# Patient Record
Sex: FEMALE | Race: WHITE | NOT HISPANIC OR LATINO | Employment: FULL TIME | URBAN - METROPOLITAN AREA
[De-identification: names, ages, dates, MRNs, and addresses within clinical notes are randomized per-mention and may not be internally consistent; named-entity substitution may affect disease eponyms.]

---

## 2023-04-12 ENCOUNTER — TELEPHONE (OUTPATIENT)
Dept: PRIMARY CARE | Facility: CLINIC | Age: 63
End: 2023-04-12
Payer: COMMERCIAL

## 2023-04-12 DIAGNOSIS — K21.9 GASTROESOPHAGEAL REFLUX DISEASE WITHOUT ESOPHAGITIS: Primary | ICD-10-CM

## 2023-04-12 PROBLEM — R91.1 LUNG NODULE: Status: ACTIVE | Noted: 2023-04-12

## 2023-04-12 PROBLEM — K59.00 CONSTIPATION: Status: ACTIVE | Noted: 2023-04-12

## 2023-04-12 PROBLEM — E78.5 HYPERLIPIDEMIA: Status: ACTIVE | Noted: 2023-04-12

## 2023-04-12 PROBLEM — K57.90 DIVERTICULOSIS OF INTESTINE: Status: ACTIVE | Noted: 2023-04-12

## 2023-04-12 PROBLEM — M79.672 LEFT FOOT PAIN: Status: ACTIVE | Noted: 2023-04-12

## 2023-04-12 PROBLEM — S92.353A CLOSED FRACTURE OF FIFTH METATARSAL BONE: Status: ACTIVE | Noted: 2023-04-12

## 2023-04-12 PROBLEM — H93.13 BILATERAL TINNITUS: Status: ACTIVE | Noted: 2023-04-12

## 2023-04-12 PROBLEM — N95.0 POSTMENOPAUSAL BLEEDING: Status: ACTIVE | Noted: 2023-04-12

## 2023-04-12 PROBLEM — N20.0 CALCIUM KIDNEY STONE: Status: ACTIVE | Noted: 2023-04-12

## 2023-04-12 PROBLEM — I10 HYPERTENSION: Status: ACTIVE | Noted: 2023-04-12

## 2023-04-12 PROBLEM — K63.5 COLON POLYPS: Status: ACTIVE | Noted: 2023-04-12

## 2023-04-12 PROBLEM — J30.2 SEASONAL ALLERGIES: Status: ACTIVE | Noted: 2023-04-12

## 2023-04-12 PROBLEM — R00.2 PALPITATIONS: Status: ACTIVE | Noted: 2023-04-12

## 2023-04-12 PROBLEM — M54.16 LUMBAR RADICULOPATHY, RIGHT: Status: ACTIVE | Noted: 2023-04-12

## 2023-04-12 PROBLEM — I25.3 ATRIAL SEPTAL ANEURYSM: Status: ACTIVE | Noted: 2023-04-12

## 2023-04-12 RX ORDER — ASPIRIN 81 MG/1
1 TABLET ORAL DAILY
COMMUNITY
Start: 2022-06-02

## 2023-04-12 RX ORDER — CETIRIZINE HYDROCHLORIDE 10 MG/1
1 TABLET ORAL DAILY
COMMUNITY

## 2023-04-12 RX ORDER — PANTOPRAZOLE SODIUM 40 MG/1
40 TABLET, DELAYED RELEASE ORAL DAILY
COMMUNITY
End: 2024-02-07 | Stop reason: SDUPTHER

## 2023-04-12 RX ORDER — LORATADINE 10 MG/1
1 TABLET ORAL DAILY PRN
COMMUNITY
Start: 2022-04-26

## 2023-04-12 RX ORDER — IPRATROPIUM BROMIDE 21 UG/1
SPRAY, METERED NASAL
COMMUNITY
Start: 2017-09-12 | End: 2023-07-10 | Stop reason: ALTCHOICE

## 2023-04-12 RX ORDER — FAMOTIDINE 40 MG/1
TABLET, FILM COATED ORAL
COMMUNITY
End: 2023-04-12 | Stop reason: SDUPTHER

## 2023-04-12 RX ORDER — FAMOTIDINE 40 MG/1
TABLET, FILM COATED ORAL
Qty: 90 TABLET | Refills: 3 | Status: SHIPPED | OUTPATIENT
Start: 2023-04-12

## 2023-04-12 RX ORDER — ALBUTEROL SULFATE 90 UG/1
AEROSOL, METERED RESPIRATORY (INHALATION)
COMMUNITY

## 2023-04-12 RX ORDER — IRBESARTAN 75 MG/1
75 TABLET ORAL DAILY
COMMUNITY
End: 2023-12-28

## 2023-04-12 RX ORDER — IPRATROPIUM BROMIDE 42 UG/1
SPRAY, METERED NASAL
COMMUNITY

## 2023-04-12 RX ORDER — ATORVASTATIN CALCIUM 10 MG/1
10 TABLET, FILM COATED ORAL DAILY
COMMUNITY
End: 2024-02-07 | Stop reason: SDUPTHER

## 2023-04-12 RX ORDER — BUDESONIDE AND FORMOTEROL FUMARATE DIHYDRATE 160; 4.5 UG/1; UG/1
AEROSOL RESPIRATORY (INHALATION)
COMMUNITY

## 2023-04-12 NOTE — TELEPHONE ENCOUNTER
Rx Refill Request Telephone Encounter    Name:  Amarilis Burger  :  [id number]  Medication Name:  Famotidine  40mg          Specific Pharmacy location:  [facility] [place]

## 2023-07-03 PROBLEM — G56.03 BILATERAL CARPAL TUNNEL SYNDROME: Status: ACTIVE | Noted: 2023-07-03

## 2023-07-03 PROBLEM — R10.13 DYSPEPSIA: Status: ACTIVE | Noted: 2023-07-03

## 2023-07-03 PROBLEM — R06.09 OTHER FORMS OF DYSPNEA: Status: ACTIVE | Noted: 2023-07-03

## 2023-07-03 PROBLEM — M15.9 GENERALIZED OSTEOARTHRITIS OF MULTIPLE SITES: Status: ACTIVE | Noted: 2023-07-03

## 2023-07-03 PROBLEM — S92.355D NONDISPLACED FRACTURE OF FIFTH LEFT METATARSAL BONE WITH ROUTINE HEALING: Status: ACTIVE | Noted: 2023-07-03

## 2023-07-03 RX ORDER — ERGOCALCIFEROL 1.25 MG/1
1.25 CAPSULE ORAL
COMMUNITY
Start: 2017-09-12

## 2023-07-03 RX ORDER — SIMETHICONE 125 MG
125 CAPSULE ORAL 4 TIMES DAILY
COMMUNITY
Start: 2023-05-25

## 2023-07-03 RX ORDER — LACTOBACILLUS ACIDOPHILUS/FOS 500MM-50MG
50 TABLET ORAL DAILY
COMMUNITY
Start: 2017-09-12

## 2023-07-10 ENCOUNTER — OFFICE VISIT (OUTPATIENT)
Dept: PRIMARY CARE | Facility: CLINIC | Age: 63
End: 2023-07-10
Payer: COMMERCIAL

## 2023-07-10 VITALS
HEART RATE: 68 BPM | WEIGHT: 179 LBS | HEIGHT: 64 IN | BODY MASS INDEX: 30.56 KG/M2 | DIASTOLIC BLOOD PRESSURE: 78 MMHG | SYSTOLIC BLOOD PRESSURE: 118 MMHG

## 2023-07-10 DIAGNOSIS — K21.9 GASTROESOPHAGEAL REFLUX DISEASE WITHOUT ESOPHAGITIS: ICD-10-CM

## 2023-07-10 DIAGNOSIS — I25.3 ATRIAL SEPTAL ANEURYSM: ICD-10-CM

## 2023-07-10 DIAGNOSIS — M25.50 POLYARTHRALGIA: Primary | ICD-10-CM

## 2023-07-10 DIAGNOSIS — M15.9 GENERALIZED OSTEOARTHRITIS OF MULTIPLE SITES: ICD-10-CM

## 2023-07-10 DIAGNOSIS — J45.40 MODERATE PERSISTENT ASTHMA WITHOUT COMPLICATION (HHS-HCC): ICD-10-CM

## 2023-07-10 DIAGNOSIS — U09.9 POST-COVID SYNDROME: ICD-10-CM

## 2023-07-10 DIAGNOSIS — R10.13 DYSPEPSIA: ICD-10-CM

## 2023-07-10 DIAGNOSIS — E78.2 MIXED HYPERLIPIDEMIA: ICD-10-CM

## 2023-07-10 PROBLEM — R00.2 PALPITATIONS: Status: RESOLVED | Noted: 2023-04-12 | Resolved: 2023-07-10

## 2023-07-10 PROCEDURE — 1126F AMNT PAIN NOTED NONE PRSNT: CPT | Performed by: INTERNAL MEDICINE

## 2023-07-10 PROCEDURE — 3074F SYST BP LT 130 MM HG: CPT | Performed by: INTERNAL MEDICINE

## 2023-07-10 PROCEDURE — 1160F RVW MEDS BY RX/DR IN RCRD: CPT | Performed by: INTERNAL MEDICINE

## 2023-07-10 PROCEDURE — 99213 OFFICE O/P EST LOW 20 MIN: CPT | Performed by: INTERNAL MEDICINE

## 2023-07-10 PROCEDURE — 1036F TOBACCO NON-USER: CPT | Performed by: INTERNAL MEDICINE

## 2023-07-10 PROCEDURE — 3078F DIAST BP <80 MM HG: CPT | Performed by: INTERNAL MEDICINE

## 2023-07-10 PROCEDURE — 1159F MED LIST DOCD IN RCRD: CPT | Performed by: INTERNAL MEDICINE

## 2023-07-10 ASSESSMENT — ENCOUNTER SYMPTOMS
MYALGIAS: 1
ABDOMINAL PAIN: 1
OCCASIONAL FEELINGS OF UNSTEADINESS: 0
LOSS OF SENSATION IN FEET: 0
DEPRESSION: 0
FATIGUE: 1
CONSTIPATION: 1
NAUSEA: 1
RHINORRHEA: 1
ARTHRALGIAS: 1

## 2023-07-10 ASSESSMENT — PATIENT HEALTH QUESTIONNAIRE - PHQ9
1. LITTLE INTEREST OR PLEASURE IN DOING THINGS: NOT AT ALL
2. FEELING DOWN, DEPRESSED OR HOPELESS: NOT AT ALL
SUM OF ALL RESPONSES TO PHQ9 QUESTIONS 1 AND 2: 0

## 2023-07-10 ASSESSMENT — ANXIETY QUESTIONNAIRES
3. WORRYING TOO MUCH ABOUT DIFFERENT THINGS: NOT AT ALL
IF YOU CHECKED OFF ANY PROBLEMS ON THIS QUESTIONNAIRE, HOW DIFFICULT HAVE THESE PROBLEMS MADE IT FOR YOU TO DO YOUR WORK, TAKE CARE OF THINGS AT HOME, OR GET ALONG WITH OTHER PEOPLE: NOT DIFFICULT AT ALL
7. FEELING AFRAID AS IF SOMETHING AWFUL MIGHT HAPPEN: NOT AT ALL
2. NOT BEING ABLE TO STOP OR CONTROL WORRYING: NOT AT ALL
4. TROUBLE RELAXING: NOT AT ALL
GAD7 TOTAL SCORE: 0
6. BECOMING EASILY ANNOYED OR IRRITABLE: NOT AT ALL
1. FEELING NERVOUS, ANXIOUS, OR ON EDGE: NOT AT ALL
5. BEING SO RESTLESS THAT IT IS HARD TO SIT STILL: NOT AT ALL

## 2023-07-10 NOTE — ASSESSMENT & PLAN NOTE
Continues on famotidine 40 mg daily with pantoprazole 40 mg daily patient exhibiting dyspepsia symptoms work-up by gastroenterology

## 2023-07-10 NOTE — ASSESSMENT & PLAN NOTE
Continues on Symbicort along with treatment of allergies also component of post-COVID syndrome and chronic fatigue stable at this time

## 2023-07-10 NOTE — ASSESSMENT & PLAN NOTE
Patient to be evaluated by orthopedic surgeon at [facility] Dr. 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

## 2023-07-10 NOTE — PROGRESS NOTES
"Subjective   Patient ID: Amarilis Burger is a 63 y.o. female who presents for Follow-up.    HPI     Review of Systems    Objective   /78 (BP Location: Left arm, Patient Position: Sitting)   Pulse 68   Ht 1.626 m (5' 4\")   Wt 81.2 kg (179 lb)   BMI 30.73 kg/m²     Physical Exam    Assessment/Plan          "

## 2023-07-10 NOTE — ASSESSMENT & PLAN NOTE
Follows with cardiologist stable at this time repeat lipid profile continue atorvastatin 10 mg daily

## 2023-07-10 NOTE — ASSESSMENT & PLAN NOTE
Patient with gallbladder type symptoms ultrasound of gallbladder revealed no gallstones 4 mm gallbladder polyp no acute inflammation HIDA scan looking at gastric emptying and gallbladder function was normal as follow-up with gastroenterology to discuss work-up

## 2023-07-10 NOTE — PROGRESS NOTES
"Subjective   Reason for Visit: Amarilis Burger is an 63 y.o. female here for a Medicare Wellness visit.     Past Medical, Surgical, and Family History reviewed and updated in chart.    Reviewed all medications by prescribing practitioner or clinical pharmacist (such as prescriptions, OTCs, herbal therapies and supplements) and documented in the medical record.    Patient states generally does not feel well as significant chronic fatigue combined with diffuse polyarthralgias and significant arthritis of her shoulders exacerbated by cervical radiculopathy in the absence of neck pain carpal tunnel syndrome chronic fatigue exacerbated by stress levels aching of muscles as well blood pressures have been stable no issues with cardiac at this time sleep has been erratic related to stress levels        Patient Care Team:  Reinaldo Nettles DO as PCP - General     Review of Systems   Constitutional:  Positive for fatigue.   HENT:  Positive for postnasal drip and rhinorrhea.    Gastrointestinal:  Positive for abdominal pain, constipation and nausea.   Musculoskeletal:  Positive for arthralgias and myalgias.       Objective   Vitals:  /78 (BP Location: Left arm, Patient Position: Sitting)   Pulse 68   Ht 1.626 m (5' 4\")   Wt 81.2 kg (179 lb)   BMI 30.73 kg/m²       Physical Exam  Vitals and nursing note reviewed.   Constitutional:       General: She is not in acute distress.     Appearance: Normal appearance. She is well-developed. She is not toxic-appearing.   HENT:      Head: Normocephalic and atraumatic.      Right Ear: Tympanic membrane and external ear normal.      Left Ear: Tympanic membrane and external ear normal.      Nose: Nose normal.      Mouth/Throat:      Mouth: Mucous membranes are moist.      Pharynx: Oropharynx is clear. No oropharyngeal exudate or posterior oropharyngeal erythema.      Tonsils: No tonsillar exudate. 2+ on the right. 2+ on the left.   Eyes:      Extraocular Movements: Extraocular " movements intact.      Conjunctiva/sclera: Conjunctivae normal.   Cardiovascular:      Rate and Rhythm: Normal rate and regular rhythm.      Pulses: Normal pulses.      Heart sounds: Normal heart sounds. No murmur heard.  Pulmonary:      Effort: Pulmonary effort is normal.      Breath sounds: Normal breath sounds.   Abdominal:      General: Abdomen is flat. Bowel sounds are normal.      Palpations: Abdomen is soft.   Musculoskeletal:      Cervical back: Neck supple.   Lymphadenopathy:      Cervical: No cervical adenopathy.   Skin:     General: Skin is warm and dry.      Findings: No rash.   Neurological:      Mental Status: She is alert. Mental status is at baseline.   Psychiatric:         Mood and Affect: Mood normal.         Behavior: Behavior normal.         Thought Content: Thought content normal.         Judgment: Judgment normal.         Assessment/Plan   Problem List Items Addressed This Visit       Atrial septal aneurysm    Current Assessment & Plan     Stable on irbesartan reevaluate with lab work         GERD (gastroesophageal reflux disease)    Current Assessment & Plan     Continues on famotidine 40 mg daily with pantoprazole 40 mg daily patient exhibiting dyspepsia symptoms work-up by gastroenterology         Hyperlipidemia    Current Assessment & Plan     Follows with cardiologist stable at this time repeat lipid profile continue atorvastatin 10 mg daily         Dyspepsia    Current Assessment & Plan     Patient with gallbladder type symptoms ultrasound of gallbladder revealed no gallstones 4 mm gallbladder polyp no acute inflammation HIDA scan looking at gastric emptying and gallbladder function was normal as follow-up with gastroenterology to discuss work-up         Generalized osteoarthritis of multiple sites    Current Assessment & Plan     Patient to be evaluated by orthopedic surgeon at [facility] Dr. 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 tunnel syndrome         Moderate persistent asthma without complication    Current Assessment & Plan     Continues on Symbicort along with treatment of allergies also component of post-COVID syndrome and chronic fatigue stable at this time         Post-COVID syndrome    Overview     Continue work addressing risk factors for chronic fatigue and possible diagnosis work-up of fibromyalgia we will check autoimmune panel including MAVERICK sed rate as it relates to her chronic polyarthralgias and myalgias with significant chronic fatigue component also stress related to work environment could benefit from behavioral counseling versus treatment of mild depression we will reevaluate in 3 months          Other Visit Diagnoses       Polyarthralgia    -  Primary    Relevant Orders    CBC and Auto Differential    Comprehensive metabolic panel    Lipid Panel    Hepatitis C antibody    Sedimentation Rate    MAVERICK with Reflex to BANDAR    Rheumatoid factor    Follow Up In [facility] - PCP - Established

## 2023-07-12 ENCOUNTER — LAB (OUTPATIENT)
Dept: LAB | Facility: LAB | Age: 63
End: 2023-07-12
Payer: COMMERCIAL

## 2023-07-12 DIAGNOSIS — M25.50 POLYARTHRALGIA: ICD-10-CM

## 2023-07-12 LAB
ALANINE AMINOTRANSFERASE (SGPT) (U/L) IN SER/PLAS: 14 U/L (ref 7–45)
ALBUMIN (G/DL) IN SER/PLAS: 4.5 G/DL (ref 3.4–5)
ALKALINE PHOSPHATASE (U/L) IN SER/PLAS: 43 U/L (ref 33–136)
ANION GAP IN SER/PLAS: 11 MMOL/L (ref 10–20)
ASPARTATE AMINOTRANSFERASE (SGOT) (U/L) IN SER/PLAS: 13 U/L (ref 9–39)
BASOPHILS (10*3/UL) IN BLOOD BY AUTOMATED COUNT: 0.02 X10E9/L (ref 0–0.1)
BASOPHILS/100 LEUKOCYTES IN BLOOD BY AUTOMATED COUNT: 0.3 % (ref 0–2)
BILIRUBIN TOTAL (MG/DL) IN SER/PLAS: 0.5 MG/DL (ref 0–1.2)
CALCIUM (MG/DL) IN SER/PLAS: 9.3 MG/DL (ref 8.6–10.3)
CARBON DIOXIDE, TOTAL (MMOL/L) IN SER/PLAS: 26 MMOL/L (ref 21–32)
CHLORIDE (MMOL/L) IN SER/PLAS: 108 MMOL/L (ref 98–107)
CHOLESTEROL (MG/DL) IN SER/PLAS: 158 MG/DL (ref 0–199)
CHOLESTEROL IN HDL (MG/DL) IN SER/PLAS: 64.5 MG/DL
CHOLESTEROL/HDL RATIO: 2.4
CREATININE (MG/DL) IN SER/PLAS: 0.62 MG/DL (ref 0.5–1.05)
ERYTHROCYTE DISTRIBUTION WIDTH (RATIO) BY AUTOMATED COUNT: 12.6 % (ref 11.5–14.5)
ERYTHROCYTE MEAN CORPUSCULAR HEMOGLOBIN CONCENTRATION (G/DL) BY AUTOMATED: 32.9 G/DL (ref 32–36)
ERYTHROCYTE MEAN CORPUSCULAR VOLUME (FL) BY AUTOMATED COUNT: 94 FL (ref 80–100)
ERYTHROCYTES (10*6/UL) IN BLOOD BY AUTOMATED COUNT: 4 X10E12/L (ref 4–5.2)
GFR FEMALE: >90 ML/MIN/1.73M2
GLUCOSE (MG/DL) IN SER/PLAS: 97 MG/DL (ref 74–99)
HEMATOCRIT (%) IN BLOOD BY AUTOMATED COUNT: 37.7 % (ref 36–46)
HEMOGLOBIN (G/DL) IN BLOOD: 12.4 G/DL (ref 12–16)
HEPATITIS C VIRUS AB PRESENCE IN SERUM: NONREACTIVE
IMMATURE GRANULOCYTES/100 LEUKOCYTES IN BLOOD BY AUTOMATED COUNT: 0.4 % (ref 0–0.9)
LDL: 80 MG/DL (ref 0–99)
LEUKOCYTES (10*3/UL) IN BLOOD BY AUTOMATED COUNT: 7.1 X10E9/L (ref 4.4–11.3)
LYMPHOCYTES (10*3/UL) IN BLOOD BY AUTOMATED COUNT: 1.34 X10E9/L (ref 1.2–4.8)
LYMPHOCYTES/100 LEUKOCYTES IN BLOOD BY AUTOMATED COUNT: 18.9 % (ref 13–44)
MONOCYTES (10*3/UL) IN BLOOD BY AUTOMATED COUNT: 0.67 X10E9/L (ref 0.1–1)
MONOCYTES/100 LEUKOCYTES IN BLOOD BY AUTOMATED COUNT: 9.4 % (ref 2–10)
NEUTROPHILS (10*3/UL) IN BLOOD BY AUTOMATED COUNT: 5.03 X10E9/L (ref 1.2–7.7)
NEUTROPHILS/100 LEUKOCYTES IN BLOOD BY AUTOMATED COUNT: 71 % (ref 40–80)
PLATELETS (10*3/UL) IN BLOOD AUTOMATED COUNT: 186 X10E9/L (ref 150–450)
POTASSIUM (MMOL/L) IN SER/PLAS: 4.3 MMOL/L (ref 3.5–5.3)
PROTEIN TOTAL: 6.4 G/DL (ref 6.4–8.2)
RHEUMATOID FACTOR (IU/ML) IN SERUM OR PLASMA: 15 IU/ML (ref 0–15)
SEDIMENTATION RATE, ERYTHROCYTE: 5 MM/H (ref 0–30)
SODIUM (MMOL/L) IN SER/PLAS: 141 MMOL/L (ref 136–145)
TRIGLYCERIDE (MG/DL) IN SER/PLAS: 69 MG/DL (ref 0–149)
UREA NITROGEN (MG/DL) IN SER/PLAS: 14 MG/DL (ref 6–23)
VLDL: 14 MG/DL (ref 0–40)

## 2023-07-12 PROCEDURE — 36415 COLL VENOUS BLD VENIPUNCTURE: CPT

## 2023-07-12 PROCEDURE — 80061 LIPID PANEL: CPT

## 2023-07-12 PROCEDURE — 86803 HEPATITIS C AB TEST: CPT

## 2023-07-12 PROCEDURE — 80053 COMPREHEN METABOLIC PANEL: CPT

## 2023-07-12 PROCEDURE — 85025 COMPLETE CBC W/AUTO DIFF WBC: CPT

## 2023-07-12 PROCEDURE — 85652 RBC SED RATE AUTOMATED: CPT

## 2023-07-12 PROCEDURE — 86431 RHEUMATOID FACTOR QUANT: CPT

## 2023-07-12 PROCEDURE — 86039 ANTINUCLEAR ANTIBODIES (ANA): CPT

## 2023-07-12 PROCEDURE — 86225 DNA ANTIBODY NATIVE: CPT

## 2023-07-12 PROCEDURE — 86038 ANTINUCLEAR ANTIBODIES: CPT

## 2023-07-12 PROCEDURE — 86235 NUCLEAR ANTIGEN ANTIBODY: CPT

## 2023-07-13 LAB
ANA PATTERN: ABNORMAL
ANA TITER: ABNORMAL
ANTI-CENTROMERE: <0.2 AI
ANTI-CHROMATIN: <0.2 AI
ANTI-DNA (DS): <1 IU/ML
ANTI-JO-1 IGG: <0.2 AI
ANTI-NUCLEAR ANTIBODY (ANA): POSITIVE
ANTI-RIBOSOMAL P: <0.2 AI
ANTI-RNP: <0.2 AI
ANTI-SCL-70: <0.2 AI
ANTI-SM/RNP: <0.2 AI
ANTI-SM: <0.2 AI
ANTI-SSA: <0.2 AI
ANTI-SSB: <0.2 AI

## 2023-08-22 ENCOUNTER — HOSPITAL ENCOUNTER (OUTPATIENT)
Dept: DATA CONVERSION | Facility: HOSPITAL | Age: 63
End: 2023-08-22
Attending: INTERNAL MEDICINE | Admitting: INTERNAL MEDICINE
Payer: COMMERCIAL

## 2023-08-22 DIAGNOSIS — R10.13 EPIGASTRIC PAIN: ICD-10-CM

## 2023-08-22 DIAGNOSIS — K29.50 UNSPECIFIED CHRONIC GASTRITIS WITHOUT BLEEDING: ICD-10-CM

## 2023-08-22 DIAGNOSIS — K44.9 DIAPHRAGMATIC HERNIA WITHOUT OBSTRUCTION OR GANGRENE: ICD-10-CM

## 2023-08-24 LAB
COMPLETE PATHOLOGY REPORT: NORMAL
CONVERTED CLINICAL DIAGNOSIS-HISTORY: NORMAL
CONVERTED FINAL DIAGNOSIS: NORMAL
CONVERTED FINAL REPORT PDF LINK TO COPY AND PASTE: NORMAL
CONVERTED GROSS DESCRIPTION: NORMAL

## 2023-09-29 VITALS — BODY MASS INDEX: 31.64 KG/M2 | HEIGHT: 63 IN | WEIGHT: 178.57 LBS

## 2023-10-03 ENCOUNTER — APPOINTMENT (OUTPATIENT)
Dept: GASTROENTEROLOGY | Facility: CLINIC | Age: 63
End: 2023-10-03
Payer: COMMERCIAL

## 2023-10-09 DIAGNOSIS — Z23 FLU VACCINE NEED: ICD-10-CM

## 2023-10-10 ENCOUNTER — OFFICE VISIT (OUTPATIENT)
Dept: PRIMARY CARE | Facility: CLINIC | Age: 63
End: 2023-10-10
Payer: COMMERCIAL

## 2023-10-10 VITALS
DIASTOLIC BLOOD PRESSURE: 70 MMHG | HEIGHT: 64 IN | SYSTOLIC BLOOD PRESSURE: 110 MMHG | BODY MASS INDEX: 31.07 KG/M2 | WEIGHT: 182 LBS | HEART RATE: 68 BPM

## 2023-10-10 DIAGNOSIS — I10 PRIMARY HYPERTENSION: Primary | ICD-10-CM

## 2023-10-10 DIAGNOSIS — M25.50 POLYARTHRALGIA: ICD-10-CM

## 2023-10-10 DIAGNOSIS — R42 VERTIGO: ICD-10-CM

## 2023-10-10 DIAGNOSIS — Z23 FLU VACCINE NEED: ICD-10-CM

## 2023-10-10 DIAGNOSIS — U09.9 POST-COVID SYNDROME: ICD-10-CM

## 2023-10-10 DIAGNOSIS — H81.13 BPV (BENIGN POSITIONAL VERTIGO), BILATERAL: ICD-10-CM

## 2023-10-10 DIAGNOSIS — E78.2 MIXED HYPERLIPIDEMIA: ICD-10-CM

## 2023-10-10 DIAGNOSIS — K21.9 GASTROESOPHAGEAL REFLUX DISEASE WITHOUT ESOPHAGITIS: ICD-10-CM

## 2023-10-10 DIAGNOSIS — J45.40 MODERATE PERSISTENT ASTHMA WITHOUT COMPLICATION (HHS-HCC): ICD-10-CM

## 2023-10-10 DIAGNOSIS — E66.09 CLASS 1 OBESITY DUE TO EXCESS CALORIES WITH SERIOUS COMORBIDITY AND BODY MASS INDEX (BMI) OF 31.0 TO 31.9 IN ADULT: ICD-10-CM

## 2023-10-10 PROBLEM — E66.811 CLASS 1 OBESITY DUE TO EXCESS CALORIES WITH SERIOUS COMORBIDITY AND BODY MASS INDEX (BMI) OF 31.0 TO 31.9 IN ADULT: Status: ACTIVE | Noted: 2023-10-10

## 2023-10-10 PROBLEM — Z00.00 PREVENTATIVE HEALTH CARE: Status: ACTIVE | Noted: 2023-10-10

## 2023-10-10 PROBLEM — R10.13 DYSPEPSIA: Status: RESOLVED | Noted: 2023-07-03 | Resolved: 2023-10-10

## 2023-10-10 PROBLEM — M79.672 LEFT FOOT PAIN: Status: RESOLVED | Noted: 2023-04-12 | Resolved: 2023-10-10

## 2023-10-10 PROBLEM — S92.353A CLOSED FRACTURE OF FIFTH METATARSAL BONE: Status: RESOLVED | Noted: 2023-04-12 | Resolved: 2023-10-10

## 2023-10-10 PROCEDURE — 90471 IMMUNIZATION ADMIN: CPT | Performed by: INTERNAL MEDICINE

## 2023-10-10 PROCEDURE — 3078F DIAST BP <80 MM HG: CPT | Performed by: INTERNAL MEDICINE

## 2023-10-10 PROCEDURE — 1036F TOBACCO NON-USER: CPT | Performed by: INTERNAL MEDICINE

## 2023-10-10 PROCEDURE — 1159F MED LIST DOCD IN RCRD: CPT | Performed by: INTERNAL MEDICINE

## 2023-10-10 PROCEDURE — 90662 IIV NO PRSV INCREASED AG IM: CPT | Performed by: INTERNAL MEDICINE

## 2023-10-10 PROCEDURE — 1126F AMNT PAIN NOTED NONE PRSNT: CPT | Performed by: INTERNAL MEDICINE

## 2023-10-10 PROCEDURE — 3008F BODY MASS INDEX DOCD: CPT | Performed by: INTERNAL MEDICINE

## 2023-10-10 PROCEDURE — 1160F RVW MEDS BY RX/DR IN RCRD: CPT | Performed by: INTERNAL MEDICINE

## 2023-10-10 PROCEDURE — 99213 OFFICE O/P EST LOW 20 MIN: CPT | Performed by: INTERNAL MEDICINE

## 2023-10-10 PROCEDURE — 3074F SYST BP LT 130 MM HG: CPT | Performed by: INTERNAL MEDICINE

## 2023-10-10 RX ORDER — MECLIZINE HYDROCHLORIDE 25 MG/1
25 TABLET ORAL 3 TIMES DAILY PRN
COMMUNITY
End: 2023-10-10 | Stop reason: SDUPTHER

## 2023-10-10 RX ORDER — MECLIZINE HYDROCHLORIDE 25 MG/1
25 TABLET ORAL 3 TIMES DAILY PRN
Qty: 180 TABLET | Refills: 1 | Status: SHIPPED | OUTPATIENT
Start: 2023-10-10

## 2023-10-10 NOTE — PROGRESS NOTES
"Subjective   Reason for Visit: Amarilis Burger is an 63 y.o. female here for a fu visit.     Past Medical, Surgical, and Family History reviewed and updated in chart.    Reviewed all medications by prescribing practitioner or clinical pharmacist (such as prescriptions, OTCs, herbal therapies and supplements) and documented in the medical record.    HPI    Patient Care Team:  Reinaldo Nettles DO as PCP - General     Review of Systems   All other systems reviewed and are negative.      Objective   Vitals:  /70 (BP Location: Left arm, Patient Position: Sitting)   Pulse 68   Ht 1.626 m (5' 4\")   Wt 82.6 kg (182 lb)   BMI 31.24 kg/m²       Physical Exam  Vitals and nursing note reviewed.   Constitutional:       General: She is not in acute distress.     Appearance: Normal appearance. She is well-developed. She is obese. She is not toxic-appearing.   HENT:      Head: Normocephalic and atraumatic.      Right Ear: Tympanic membrane and external ear normal.      Left Ear: Tympanic membrane and external ear normal.      Nose: Nose normal.      Mouth/Throat:      Mouth: Mucous membranes are moist.      Pharynx: Oropharynx is clear. No oropharyngeal exudate or posterior oropharyngeal erythema.      Tonsils: No tonsillar exudate. 2+ on the right. 2+ on the left.   Eyes:      Extraocular Movements: Extraocular movements intact.      Conjunctiva/sclera: Conjunctivae normal.   Cardiovascular:      Rate and Rhythm: Normal rate and regular rhythm.      Pulses: Normal pulses.      Heart sounds: Normal heart sounds. No murmur heard.  Pulmonary:      Effort: Pulmonary effort is normal.      Breath sounds: Normal breath sounds.   Abdominal:      General: Abdomen is flat. Bowel sounds are normal.      Palpations: Abdomen is soft.   Musculoskeletal:      Cervical back: Neck supple.   Lymphadenopathy:      Cervical: No cervical adenopathy.   Skin:     General: Skin is warm and dry.      Findings: No rash.   Neurological:      " Mental Status: She is alert. Mental status is at baseline.   Psychiatric:         Mood and Affect: Mood normal.         Behavior: Behavior normal.         Thought Content: Thought content normal.         Judgment: Judgment normal.         Assessment/Plan   Problem List Items Addressed This Visit       GERD (gastroesophageal reflux disease)    Current Assessment & Plan     Recent EGD revealed mild gastritis without ulcer patient continues on pantoprazole 40 mg daily with famotidine nightly continue         Relevant Orders    BI mammo bilateral screening tomosynthesis    XR DEXA bone density    Referral to Rheumatology    Follow Up In [facility] - PCP - Established    Comprehensive metabolic panel    Lipid Panel    CK    Hyperlipidemia    Current Assessment & Plan     Continue atorvastatin 10 mg daily reevaluate lipid profile with next visit         Hypertension - Primary    Current Assessment & Plan     Blood pressure well controlled on irbesartan 75 mg daily continue         Relevant Orders    BI mammo bilateral screening tomosynthesis    XR DEXA bone density    Referral to Rheumatology    Follow Up In [facility] - PCP - Established    Comprehensive metabolic panel    Lipid Panel    CK    Moderate persistent asthma without complication    Current Assessment & Plan     Well-controlled without exacerbation at this time patient had mild upper respiratory tract illness with eustachian tube dysfunction affecting right ear sees allergist immunologist next week up-to-date with vaccinations         Relevant Orders    BI mammo bilateral screening tomosynthesis    XR DEXA bone density    Referral to Rheumatology    Follow Up In [facility] - PCP - Established    Comprehensive metabolic panel    Lipid Panel    CK    Post-COVID syndrome    Overview     Continue work addressing risk factors for chronic fatigue and possible diagnosis work-up of fibromyalgia we will check autoimmune panel including  MAVERICK sed rate as it relates to her chronic polyarthralgias and myalgias with significant chronic fatigue component also stress related to work environment could benefit from behavioral counseling versus treatment of mild depression we will reevaluate in 3 months         Current Assessment & Plan     Recent autoimmune work-up unrevealing remarkable we will make formal referral to rheumatologist to evaluate for chronic fatigue syndrome possibly treatment with fibromyalgia look into therapies that may help patient using low-dose cyclobenzaprine but cannot take full dose due to fatigue and hypersomnolence during the day impacting her ability to work arthralgias mostly of her hands and knees continue can consider topical nonsteroidal such as Voltaren gel patient low level of mild depression but not interested in treatment with medication at this time considerations will be to consider duloxetine with possible considerations for pregabalin to treat fibromyalgia pain will await evaluation by rheumatology first         Class 1 obesity due to excess calories with serious comorbidity and body mass index (BMI) of 31.0 to 31.9 in adult    Current Assessment & Plan     Continue working strategies to improve diet and exercise with weight loss BMI 31 with 4 pound weight gain since last examination         Relevant Orders    BI mammo bilateral screening tomosynthesis    XR DEXA bone density    Referral to Rheumatology    Follow Up In [facility] - PCP - Established    Comprehensive metabolic panel    Lipid Panel    CK    BPV (benign positional vertigo), bilateral    Current Assessment & Plan     Refill maxillae meclizine to use on as-needed basis         Relevant Medications    meclizine (Antivert) 25 mg tablet    Flu vaccine need    Relevant Orders    BI mammo bilateral screening tomosynthesis    XR DEXA bone density    Referral to Rheumatology    Follow Up In [facility] - PCP - Established    Comprehensive  metabolic panel    Lipid Panel    CK    Polyarthralgia    Relevant Orders    BI mammo bilateral screening tomosynthesis    XR DEXA bone density    Referral to Rheumatology    Follow Up In [facility] - PCP - Established    Comprehensive metabolic panel    Lipid Panel    CK

## 2023-10-10 NOTE — ASSESSMENT & PLAN NOTE
Screening mammogram ordered for this year up-to-date with colon cancer screening I will also place order for bone density with history of fracture recheck vitamin D levels reevaluate when she returns

## 2023-10-10 NOTE — ASSESSMENT & PLAN NOTE
Continue working strategies to improve diet and exercise with weight loss BMI 31 with 4 pound weight gain since last examination

## 2023-10-10 NOTE — PROGRESS NOTES
"Subjective   Patient ID: Amarilis Burger is a 63 y.o. female who presents for Follow-up.    HPI     Review of Systems    Objective   /70 (BP Location: Left arm, Patient Position: Sitting)   Pulse 68   Ht 1.626 m (5' 4\")   Wt 82.6 kg (182 lb)   BMI 31.24 kg/m²     Physical Exam    Assessment/Plan          "

## 2023-10-10 NOTE — ASSESSMENT & PLAN NOTE
Recent EGD revealed mild gastritis without ulcer patient continues on pantoprazole 40 mg daily with famotidine nightly continue

## 2023-10-10 NOTE — ASSESSMENT & PLAN NOTE
Well-controlled without exacerbation at this time patient had mild upper respiratory tract illness with eustachian tube dysfunction affecting right ear sees allergist immunologist next week up-to-date with vaccinations

## 2023-10-10 NOTE — ASSESSMENT & PLAN NOTE
Recent autoimmune work-up unrevealing remarkable we will make formal referral to rheumatologist to evaluate for chronic fatigue syndrome possibly treatment with fibromyalgia look into therapies that may help patient using low-dose cyclobenzaprine but cannot take full dose due to fatigue and hypersomnolence during the day impacting her ability to work arthralgias mostly of her hands and knees continue can consider topical nonsteroidal such as Voltaren gel patient low level of mild depression but not interested in treatment with medication at this time considerations will be to consider duloxetine with possible considerations for pregabalin to treat fibromyalgia pain will await evaluation by rheumatology first

## 2023-10-24 ENCOUNTER — OFFICE VISIT (OUTPATIENT)
Dept: GASTROENTEROLOGY | Facility: CLINIC | Age: 63
End: 2023-10-24
Payer: COMMERCIAL

## 2023-10-24 VITALS
HEART RATE: 82 BPM | SYSTOLIC BLOOD PRESSURE: 131 MMHG | BODY MASS INDEX: 31.24 KG/M2 | DIASTOLIC BLOOD PRESSURE: 80 MMHG | WEIGHT: 182 LBS

## 2023-10-24 DIAGNOSIS — D12.5 ADENOMATOUS POLYP OF SIGMOID COLON: Primary | ICD-10-CM

## 2023-10-24 DIAGNOSIS — K30 FUNCTIONAL DYSPEPSIA: ICD-10-CM

## 2023-10-24 PROCEDURE — 99213 OFFICE O/P EST LOW 20 MIN: CPT | Performed by: PHYSICIAN ASSISTANT

## 2023-10-24 PROCEDURE — 3008F BODY MASS INDEX DOCD: CPT | Performed by: PHYSICIAN ASSISTANT

## 2023-10-24 PROCEDURE — 1159F MED LIST DOCD IN RCRD: CPT | Performed by: PHYSICIAN ASSISTANT

## 2023-10-24 PROCEDURE — 3075F SYST BP GE 130 - 139MM HG: CPT | Performed by: PHYSICIAN ASSISTANT

## 2023-10-24 PROCEDURE — 3079F DIAST BP 80-89 MM HG: CPT | Performed by: PHYSICIAN ASSISTANT

## 2023-10-24 PROCEDURE — 1126F AMNT PAIN NOTED NONE PRSNT: CPT | Performed by: PHYSICIAN ASSISTANT

## 2023-10-24 PROCEDURE — 1160F RVW MEDS BY RX/DR IN RCRD: CPT | Performed by: PHYSICIAN ASSISTANT

## 2023-10-24 PROCEDURE — 1036F TOBACCO NON-USER: CPT | Performed by: PHYSICIAN ASSISTANT

## 2023-10-24 RX ORDER — ONDANSETRON 4 MG/1
4 TABLET, FILM COATED ORAL 3 TIMES DAILY
Qty: 21 TABLET | Refills: 2 | Status: SHIPPED | OUTPATIENT
Start: 2023-10-24 | End: 2023-11-14

## 2023-10-24 RX ORDER — AMITRIPTYLINE HYDROCHLORIDE 25 MG/1
25 TABLET, FILM COATED ORAL NIGHTLY
Qty: 30 TABLET | Refills: 11 | Status: CANCELLED | OUTPATIENT
Start: 2023-10-24 | End: 2024-10-23

## 2023-10-24 NOTE — ASSESSMENT & PLAN NOTE
Patient's colonoscopy in February 2020. 1 TA and multiple hyperplastic colon polyps removed. Repeat recommended in February 2025. Discussed with patient.

## 2023-10-24 NOTE — ASSESSMENT & PLAN NOTE
Discussed with patient that EGD showed only a small hiatal hernia. Gastric biopsies showed mild chronic gastritis without H. Pylori infection. She has had extensive evaluation, including endoscopy, RUQ US, HIDA scan, Gastric emptying study. We discussed fucntional dyspepsia. She reports a lot of work stress, which may be contributing.  Brought up dietitian, unfortunately patient not able to afford at this time. We also discussed amitriptyline. Patient states that ENT had her on this in the past (dose unknown) and it didn't work and she wouldn't want to try again. I did recommend trial of Fdgard (dory oil). Samples given. She states that she will follow up as needed. Zofran prescription given for PRN nausea.

## 2023-10-24 NOTE — PROGRESS NOTES
"Subjective   Patient ID: Amarilis Burger is a 63 y.o. female who presents for Follow-up (EGD follow up from 8/22/23/LS: 7/17/23 for dysphagia).    Patient's PCP is Dr. Nettles    HPI  Patient has been evaluated by Dr. Evans, Dr. Gomez, and myself in the past for complaints of GERD, constipation, polyps. . She takes miralax twice daily for chronic constipation. She does have a personal history of colon polyps. EGD in 2017 showed esophagitis and gastritis, no evidence of H. pylori. She did undergo colonoscopy with Dr. Gmoez in February 2020 that showed diverticulosis. 1 polyp was removed from the sigmoid colon that was a tubular adenoma. Multiple hyperplastic colon polyps were seen. Repeat colonoscopy recommended in 5 years.      She was last seen in August 2023. Patient has been on pantoprazole in the morning and famotidine at night. she states that her heartburn is fairly well controlled. however, she noticed an increase in bloating and states that generally she just doesn't \"feel good\". Her biggest symptom was bloating and LUQ abdominal pain. She has been under stress at her job. I ordered RUQ US, HIDA scan, and gastric emptying study was unremarkable. She was given simethicone, which did help some. She continues to just have some intermittent nausea and upset. stomach. Due to ongoing symptoms, she underwent EGD in August 2023. Hiatal hernia was seen, but was otherwise grossly normal. Gastric biopsies showed mild chronic gastritis without H. Pylori.    Patient states that she is about the same. She continues to have bloating, LUQ abdominal pain, nausea, sense of food going down slower. She states that everything she eats bothers her. She is using the simethicone when she remembers. She is taking miralax, constipation OK. Denies vomiting, melena, hematochezia.    Prior GI evaluation:  Listed in HPI    Review of Systems:  GI: Reports ongoing abdominal pain, bloating, occasional heartburn.  : No reported dysuria, " hematuria, or frequency.  Neuro: No reported confusion, memory loss, headaches, or dizziness.  Psych: No reported anxiety, depression, or insomnia.  Musculoskeletal: No reported arthralgia, joint swelling, or myalgias.  Heme/lymph: No reported easy bleeding or bruising, or swollen lymph nodes.  Endocrine: No reported cold/heat intolerance, polydipsia, or polyuria.      Medications:  Prior to Admission medications    Medication Sig Start Date End Date Taking? Authorizing Provider   albuterol 90 mcg/actuation inhaler INHALE 2 PUFFS BY MOUTH AS INSTRUCTED EVERY 4 HOURS AS NEEDED   Yes Historical Provider, MD   aspirin 81 mg EC tablet Take 1 tablet (81 mg) by mouth once daily. 6/2/22  Yes Historical Provider, MD   atorvastatin (Lipitor) 10 mg tablet Take 1 tablet (10 mg) by mouth once daily.   Yes Historical Provider, MD   cetirizine (ZyrTEC) 10 mg tablet Take 1 tablet (10 mg) by mouth once daily.   Yes Historical Provider, MD   ergocalciferol (Vitamin D-2) 1.25 MG (06001 UT) capsule Take 1 capsule (1,250 mcg) by mouth 1 (one) time per week. 9/12/17  Yes Historical Provider, MD   famotidine (Pepcid) 40 mg tablet TAKE ONE TABLET BY MOUTH DAILY AS NEEDED FOR HEARTBURN 4/12/23  Yes Reinaldo Nettles,    ipratropium (Atrovent) 42 mcg (0.06 %) nasal spray INSTILL 2 SPRAYS INTO EACH NOSTRIL THREE TIMES A DAY   Yes Historical Provider, MD   irbesartan (Avapro) 75 mg tablet Take 1 tablet (75 mg) by mouth once daily.   Yes Historical Provider, MD   Lactobacillus acidophilus/FOS (Lactobac acidoph-fructooligos) 500 million cell-50 mg tablet Take 50 mg by mouth once daily. 9/12/17  Yes Historical Provider, MD   loratadine (Claritin) 10 mg tablet Take 1 tablet (10 mg) by mouth once daily as needed. 4/26/22  Yes Historical Provider, MD   meclizine (Antivert) 25 mg tablet Take 1 tablet (25 mg) by mouth 3 times a day as needed for dizziness. 10/10/23  Yes Reinaldo Nettles DO   pantoprazole (ProtoNix) 40 mg EC tablet Take 1 tablet  (40 mg) by mouth once daily.   Yes Historical Provider, MD   simethicone (Mylicon,Gas-X) 125 mg capsule Take 1 capsule (125 mg) by mouth 4 times a day. 5/25/23  Yes Historical Provider, MD   Symbicort 160-4.5 mcg/actuation inhaler INHALE 2 PUFFS INTO THE LUNGS TWICE DAILY AS DIRECTED   Yes Historical Provider, MD       Allergies:  Patient has no known allergies.    Past Medical History:  She has a past medical history of Abnormal findings on diagnostic imaging of other specified body structures (07/07/2021), Acute maxillary sinusitis, unspecified (04/28/2021), Dizziness and giddiness (01/06/2021), Encounter for other screening for malignant neoplasm of breast (06/25/2020), Impacted cerumen, right ear (09/12/2017), Other conditions influencing health status (12/07/2020), Other muscle spasm (01/06/2021), Other specified disorders of bladder (11/18/2019), Pain in left foot (06/30/2022), Parageusia (11/27/2020), Personal history of (healed) traumatic fracture (07/02/2022), Personal history of other diseases of the musculoskeletal system and connective tissue, Personal history of other diseases of the respiratory system, Personal history of other specified conditions (11/18/2019), Personal history of other specified conditions (06/02/2022), Personal history of other specified conditions (06/21/2022), Personal history of other specified conditions (11/27/2020), Personal history of other specified conditions (07/07/2021), Personal history of urinary calculi (12/01/2017), Unspecified otitis externa, unspecified ear (12/20/2021), Urinary tract infection, site not specified (11/18/2019), and Urinary tract infection, site not specified (11/18/2019).    Past Surgical History:  She has a past surgical history that includes Total hip arthroplasty (09/12/2017); Hand surgery (09/12/2017); Shoulder surgery (09/12/2017); Tonsillectomy (09/12/2017); Foot surgery (09/12/2017); Other surgical history (06/25/2020); and Foot surgery  "(11/21/2017).    Social History:  She reports that she has quit smoking. Her smoking use included cigarettes. She has never used smokeless tobacco. She reports current alcohol use. She reports that she does not use drugs.    Objective   Physical exam:  Constitutional: Well developed, well nourished 63 y.o. year old in no acute distress.   CV: RRR  Resp: CTAB  GI: Normal bowel sounds. Soft, nondistended, TTP in LUQ. No rebound or guarding.  Neuro: Alert and oriented x 3. Cranial nerves 2-12 grossly intact.   Psych: Normal mood and affect.      Relevant Results Recent labs reviewed in the EMR.  Lab Results   Component Value Date    HGB 12.4 07/12/2023    HGB 13.2 05/27/2022    HGB 13.1 12/11/2021    MCV 94 07/12/2023    MCV 93 05/27/2022    MCV 96 12/11/2021     07/12/2023     05/27/2022     12/11/2021       No results found for: \"FERRITIN\", \"IRON\"    Lab Results   Component Value Date     07/12/2023    K 4.3 07/12/2023     (H) 07/12/2023    BUN 14 07/12/2023    CREATININE 0.62 07/12/2023       Lab Results   Component Value Date    BILITOT 0.5 07/12/2023     Lab Results   Component Value Date    ALT 14 07/12/2023    ALT 13 06/28/2022    ALT 17 05/27/2022    AST 13 07/12/2023    AST 12 06/28/2022    AST 15 05/27/2022    ALKPHOS 43 07/12/2023    ALKPHOS 45 06/28/2022    ALKPHOS 42 05/27/2022       No results found for: \"CRP\"    No results found for: \"CALPS\"    Radiology: Reviewed imaging reviewed in the EMR.  No results found.    Assessment/Plan   Problem List Items Addressed This Visit             ICD-10-CM       Gastrointestinal and Abdominal    Colon polyps - Primary K63.5     Patient's colonoscopy in February 2020. 1 TA and multiple hyperplastic colon polyps removed. Repeat recommended in February 2025. Discussed with patient.          Functional dyspepsia K30     Discussed with patient that EGD showed only a small hiatal hernia. Gastric biopsies showed mild chronic gastritis without " H. Pylori infection. She has had extensive evaluation, including endoscopy, RUQ US, HIDA scan, Gastric emptying study. We discussed fucntional dyspepsia. She reports a lot of work stress, which may be contributing.  Brought up dietitian, unfortunately patient not able to afford at this time. We also discussed amitriptyline. Patient states that ENT had her on this in the past (dose unknown) and it didn't work and she wouldn't want to try again. I did recommend trial of Fdgard (dory oil). Samples given. She states that she will follow up as needed. Zofran prescription given for PRN nausea.              Kiara Barrett PA-C

## 2023-11-20 ENCOUNTER — OFFICE VISIT (OUTPATIENT)
Dept: OBSTETRICS AND GYNECOLOGY | Facility: CLINIC | Age: 63
End: 2023-11-20
Payer: COMMERCIAL

## 2023-11-20 VITALS
SYSTOLIC BLOOD PRESSURE: 110 MMHG | HEIGHT: 64 IN | DIASTOLIC BLOOD PRESSURE: 80 MMHG | WEIGHT: 181 LBS | BODY MASS INDEX: 30.9 KG/M2

## 2023-11-20 DIAGNOSIS — Z12.31 SCREENING MAMMOGRAM FOR BREAST CANCER: ICD-10-CM

## 2023-11-20 DIAGNOSIS — Z01.419 WELL WOMAN EXAM: Primary | ICD-10-CM

## 2023-11-20 PROCEDURE — 87624 HPV HI-RISK TYP POOLED RSLT: CPT

## 2023-11-20 PROCEDURE — 3074F SYST BP LT 130 MM HG: CPT | Performed by: OBSTETRICS & GYNECOLOGY

## 2023-11-20 PROCEDURE — 3008F BODY MASS INDEX DOCD: CPT | Performed by: OBSTETRICS & GYNECOLOGY

## 2023-11-20 PROCEDURE — 99397 PER PM REEVAL EST PAT 65+ YR: CPT | Performed by: OBSTETRICS & GYNECOLOGY

## 2023-11-20 PROCEDURE — 3079F DIAST BP 80-89 MM HG: CPT | Performed by: OBSTETRICS & GYNECOLOGY

## 2023-11-20 PROCEDURE — 1036F TOBACCO NON-USER: CPT | Performed by: OBSTETRICS & GYNECOLOGY

## 2023-11-20 PROCEDURE — 1126F AMNT PAIN NOTED NONE PRSNT: CPT | Performed by: OBSTETRICS & GYNECOLOGY

## 2023-11-20 PROCEDURE — 1160F RVW MEDS BY RX/DR IN RCRD: CPT | Performed by: OBSTETRICS & GYNECOLOGY

## 2023-11-20 PROCEDURE — 1159F MED LIST DOCD IN RCRD: CPT | Performed by: OBSTETRICS & GYNECOLOGY

## 2023-11-20 PROCEDURE — 88175 CYTOPATH C/V AUTO FLUID REDO: CPT

## 2023-11-20 NOTE — PROGRESS NOTES
Subjective   Patient ID: Amarilis Burger is a 63 y.o. female who presents for Annual Exam (Here for annual exam ).  HPI  63-year-old here for her annual physical exam.  Patient with no problems.  Continues with mild hot flashes and night sweats which she is tolerating well.  Patient also continues with her occasional pelvic pain.  Patient did have a normal ultrasound last year.  Patient is due for repeat Pap.  Patient is also due for mammogram.  Patient with no concerns.        Objective   Physical Exam  Exam conducted with a chaperone present.   Constitutional:       Appearance: Normal appearance.   Cardiovascular:      Rate and Rhythm: Normal rate and regular rhythm.   Pulmonary:      Effort: Pulmonary effort is normal.      Breath sounds: Normal breath sounds.   Chest:   Breasts:     Right: Normal. No mass or tenderness.      Left: Normal. No mass or tenderness.   Abdominal:      Palpations: Abdomen is soft. There is no mass.      Tenderness: There is no abdominal tenderness.   Genitourinary:     General: Normal vulva.      Vagina: Normal. No lesions.      Cervix: No lesion.      Uterus: Normal. Not enlarged and not tender.       Adnexa: Right adnexa normal and left adnexa normal.        Right: No mass or tenderness.          Left: No mass or tenderness.        Rectum: Normal.      Comments: Atrophic  Musculoskeletal:      Cervical back: Neck supple.   Skin:     General: Skin is warm and dry.   Neurological:      Mental Status: She is alert and oriented to person, place, and time.   Psychiatric:         Mood and Affect: Mood normal.         Behavior: Behavior normal.         Assessment/Plan   Problem List Items Addressed This Visit             ICD-10-CM    Well woman exam - Primary Z01.419     --ANNUAL EXAM: Patient is doing well with no concerns and normal exam.  -- MENOPAUSE: Patient with occasional hot flashes or night sweats.  Patient is tolerating well.  --Normal Pap and HPV in 2020, will repeat  today  --Normal mammogram November 2022  --S/p colonoscopy in 2020 with polyps, recommended repeat in 5 years  --Patient is G0    PLAN:  Pap and HPV  Mammogram  Follow-up in 1 to 2 years         Relevant Orders    THINPREP PAP     Other Visit Diagnoses         Codes    Screening mammogram for breast cancer     Z12.31

## 2023-11-20 NOTE — ASSESSMENT & PLAN NOTE
--ANNUAL EXAM: Patient is doing well with no concerns and normal exam.  -- MENOPAUSE: Patient with occasional hot flashes or night sweats.  Patient is tolerating well.  --Normal Pap and HPV in 2020, will repeat today  --Normal mammogram November 2022  --S/p colonoscopy in 2020 with polyps, recommended repeat in 5 years  --Patient is G0    PLAN:  Pap and HPV  Mammogram  Follow-up in 1 to 2 years

## 2023-12-08 ENCOUNTER — ANCILLARY PROCEDURE (OUTPATIENT)
Dept: RADIOLOGY | Facility: CLINIC | Age: 63
End: 2023-12-08
Payer: COMMERCIAL

## 2023-12-08 DIAGNOSIS — K21.9 GASTROESOPHAGEAL REFLUX DISEASE WITHOUT ESOPHAGITIS: ICD-10-CM

## 2023-12-08 DIAGNOSIS — Z23 FLU VACCINE NEED: ICD-10-CM

## 2023-12-08 DIAGNOSIS — M25.50 POLYARTHRALGIA: ICD-10-CM

## 2023-12-08 DIAGNOSIS — J45.40 MODERATE PERSISTENT ASTHMA WITHOUT COMPLICATION (HHS-HCC): ICD-10-CM

## 2023-12-08 DIAGNOSIS — R42 VERTIGO: ICD-10-CM

## 2023-12-08 DIAGNOSIS — E66.09 CLASS 1 OBESITY DUE TO EXCESS CALORIES WITH SERIOUS COMORBIDITY AND BODY MASS INDEX (BMI) OF 31.0 TO 31.9 IN ADULT: ICD-10-CM

## 2023-12-08 DIAGNOSIS — I10 PRIMARY HYPERTENSION: ICD-10-CM

## 2023-12-08 PROCEDURE — 77080 DXA BONE DENSITY AXIAL: CPT

## 2023-12-08 PROCEDURE — 77080 DXA BONE DENSITY AXIAL: CPT | Performed by: RADIOLOGY

## 2023-12-14 ENCOUNTER — ANCILLARY PROCEDURE (OUTPATIENT)
Dept: RADIOLOGY | Facility: CLINIC | Age: 63
End: 2023-12-14
Payer: COMMERCIAL

## 2023-12-14 VITALS — BODY MASS INDEX: 31.71 KG/M2 | WEIGHT: 179 LBS | HEIGHT: 63 IN

## 2023-12-14 DIAGNOSIS — R42 VERTIGO: ICD-10-CM

## 2023-12-14 DIAGNOSIS — E66.09 CLASS 1 OBESITY DUE TO EXCESS CALORIES WITH SERIOUS COMORBIDITY AND BODY MASS INDEX (BMI) OF 31.0 TO 31.9 IN ADULT: ICD-10-CM

## 2023-12-14 DIAGNOSIS — I10 PRIMARY HYPERTENSION: ICD-10-CM

## 2023-12-14 DIAGNOSIS — Z23 FLU VACCINE NEED: ICD-10-CM

## 2023-12-14 DIAGNOSIS — M25.50 POLYARTHRALGIA: ICD-10-CM

## 2023-12-14 DIAGNOSIS — K21.9 GASTROESOPHAGEAL REFLUX DISEASE WITHOUT ESOPHAGITIS: ICD-10-CM

## 2023-12-14 DIAGNOSIS — J45.40 MODERATE PERSISTENT ASTHMA WITHOUT COMPLICATION (HHS-HCC): ICD-10-CM

## 2023-12-14 PROCEDURE — 77067 SCR MAMMO BI INCL CAD: CPT

## 2023-12-14 PROCEDURE — 77067 SCR MAMMO BI INCL CAD: CPT | Performed by: RADIOLOGY

## 2023-12-14 PROCEDURE — 77063 BREAST TOMOSYNTHESIS BI: CPT | Performed by: RADIOLOGY

## 2023-12-22 ENCOUNTER — APPOINTMENT (OUTPATIENT)
Dept: RADIOLOGY | Facility: HOSPITAL | Age: 63
End: 2023-12-22
Payer: COMMERCIAL

## 2023-12-22 ENCOUNTER — HOSPITAL ENCOUNTER (EMERGENCY)
Facility: HOSPITAL | Age: 63
Discharge: HOME | End: 2023-12-22
Attending: EMERGENCY MEDICINE
Payer: COMMERCIAL

## 2023-12-22 ENCOUNTER — PHARMACY VISIT (OUTPATIENT)
Dept: PHARMACY | Facility: CLINIC | Age: 63
End: 2023-12-22
Payer: MEDICARE

## 2023-12-22 VITALS
BODY MASS INDEX: 31.89 KG/M2 | RESPIRATION RATE: 18 BRPM | WEIGHT: 180 LBS | HEART RATE: 72 BPM | SYSTOLIC BLOOD PRESSURE: 144 MMHG | TEMPERATURE: 97.7 F | HEIGHT: 63 IN | OXYGEN SATURATION: 97 % | DIASTOLIC BLOOD PRESSURE: 86 MMHG

## 2023-12-22 DIAGNOSIS — S82.892A CLOSED FRACTURE DISLOCATION OF LEFT ANKLE, INITIAL ENCOUNTER: Primary | ICD-10-CM

## 2023-12-22 PROCEDURE — 73590 X-RAY EXAM OF LOWER LEG: CPT | Mod: LEFT SIDE | Performed by: RADIOLOGY

## 2023-12-22 PROCEDURE — 99285 EMERGENCY DEPT VISIT HI MDM: CPT | Performed by: EMERGENCY MEDICINE

## 2023-12-22 PROCEDURE — 73610 X-RAY EXAM OF ANKLE: CPT | Mod: LT,FR

## 2023-12-22 PROCEDURE — 73590 X-RAY EXAM OF LOWER LEG: CPT | Mod: LT,FR

## 2023-12-22 PROCEDURE — 99152 MOD SED SAME PHYS/QHP 5/>YRS: CPT | Performed by: EMERGENCY MEDICINE

## 2023-12-22 PROCEDURE — 27840 TREAT ANKLE DISLOCATION: CPT | Performed by: EMERGENCY MEDICINE

## 2023-12-22 PROCEDURE — 73610 X-RAY EXAM OF ANKLE: CPT | Mod: LEFT SIDE | Performed by: RADIOLOGY

## 2023-12-22 PROCEDURE — RXMED WILLOW AMBULATORY MEDICATION CHARGE

## 2023-12-22 PROCEDURE — 94681 O2 UPTK CO2 OUTP % O2 XTRC: CPT

## 2023-12-22 PROCEDURE — 2500000004 HC RX 250 GENERAL PHARMACY W/ HCPCS (ALT 636 FOR OP/ED): Performed by: EMERGENCY MEDICINE

## 2023-12-22 RX ORDER — PROPOFOL 10 MG/ML
0.5 INJECTION, EMULSION INTRAVENOUS AS NEEDED
Status: DISCONTINUED | OUTPATIENT
Start: 2023-12-22 | End: 2023-12-22 | Stop reason: HOSPADM

## 2023-12-22 RX ORDER — SODIUM CHLORIDE 9 MG/ML
INJECTION, SOLUTION INTRAVENOUS
Status: COMPLETED | OUTPATIENT
Start: 2023-12-22 | End: 2023-12-22

## 2023-12-22 RX ORDER — HYDROCODONE BITARTRATE AND ACETAMINOPHEN 5; 325 MG/1; MG/1
1 TABLET ORAL EVERY 4 HOURS PRN
Qty: 12 TABLET | Refills: 0 | Status: ON HOLD | OUTPATIENT
Start: 2023-12-22 | End: 2023-12-29 | Stop reason: SDUPTHER

## 2023-12-22 RX ORDER — MORPHINE SULFATE 4 MG/ML
4 INJECTION, SOLUTION INTRAMUSCULAR; INTRAVENOUS ONCE
Status: COMPLETED | OUTPATIENT
Start: 2023-12-22 | End: 2023-12-22

## 2023-12-22 RX ORDER — PROPOFOL 10 MG/ML
INJECTION, EMULSION INTRAVENOUS CODE/TRAUMA/SEDATION MEDICATION
Status: COMPLETED | OUTPATIENT
Start: 2023-12-22 | End: 2023-12-22

## 2023-12-22 RX ORDER — PROPOFOL 10 MG/ML
1 INJECTION, EMULSION INTRAVENOUS ONCE
Status: COMPLETED | OUTPATIENT
Start: 2023-12-22 | End: 2023-12-22

## 2023-12-22 RX ADMIN — PROPOFOL 50 MG: 10 INJECTION, EMULSION INTRAVENOUS at 19:28

## 2023-12-22 RX ADMIN — SODIUM CHLORIDE 999 ML/HR: 9 INJECTION, SOLUTION INTRAVENOUS at 19:28

## 2023-12-22 RX ADMIN — MORPHINE SULFATE 2 MG: 4 INJECTION, SOLUTION INTRAMUSCULAR; INTRAVENOUS at 19:41

## 2023-12-22 ASSESSMENT — COLUMBIA-SUICIDE SEVERITY RATING SCALE - C-SSRS
2. HAVE YOU ACTUALLY HAD ANY THOUGHTS OF KILLING YOURSELF?: NO
6. HAVE YOU EVER DONE ANYTHING, STARTED TO DO ANYTHING, OR PREPARED TO DO ANYTHING TO END YOUR LIFE?: NO
1. IN THE PAST MONTH, HAVE YOU WISHED YOU WERE DEAD OR WISHED YOU COULD GO TO SLEEP AND NOT WAKE UP?: NO

## 2023-12-22 ASSESSMENT — PAIN - FUNCTIONAL ASSESSMENT: PAIN_FUNCTIONAL_ASSESSMENT: 0-10

## 2023-12-22 ASSESSMENT — PAIN SCALES - GENERAL: PAINLEVEL_OUTOF10: 7

## 2023-12-22 NOTE — ED PROVIDER NOTES
HPI   Chief Complaint   Patient presents with    Ankle Pain     Twisted L ankle, +deformity       Patient has a left ankle deformity.  She states that she was walking down the stairs when she slipped and fell.  She was unable to walk after the fall.  She noted a deformity to the ankle and called EMS.  She denies any head trauma or LOC.  She is having some cramping in her leg.  She denies any knee pain or foot pain associated with this.  EMS was called and placed her in a vacuum splint.  They noted good pulses and capillary refill distally.  She has had a previous ankle fracture on the other ankle but none to this.                          Rusty Coma Scale Score: 15                  Patient History   Past Medical History:   Diagnosis Date    Abnormal findings on diagnostic imaging of other specified body structures 07/07/2021    Abnormal CXR    Acute maxillary sinusitis, unspecified 04/28/2021    Acute maxillary sinusitis, unspecified    Dizziness and giddiness 01/06/2021    Cervical vertigo    Encounter for other screening for malignant neoplasm of breast 06/25/2020    Breast screening    Impacted cerumen, right ear 09/12/2017    Excessive cerumen in ear canal, right    Other conditions influencing health status 12/07/2020    History of cough    Other muscle spasm 01/06/2021    Muscle spasm, nocturnal    Other specified disorders of bladder 11/18/2019    Unstable bladder    Pain in left foot 06/30/2022    Left foot pain    Parageusia 11/27/2020    Loss of taste    Personal history of (healed) traumatic fracture 07/02/2022    History of fracture of foot    Personal history of other diseases of the musculoskeletal system and connective tissue     History of fibromyalgia    Personal history of other diseases of the respiratory system     History of respiratory distress    Personal history of other specified conditions 11/18/2019    History of urinary frequency    Personal history of other specified conditions  06/02/2022    History of tachycardia    Personal history of other specified conditions 06/21/2022    History of epigastric pain    Personal history of other specified conditions 11/27/2020    History of nasal congestion    Personal history of other specified conditions 07/07/2021    History of fatigue    Personal history of urinary calculi 12/01/2017    History of kidney stones    Unspecified otitis externa, unspecified ear 12/20/2021    Otitis externa    Urinary tract infection, site not specified 11/18/2019    Frequent UTI    Urinary tract infection, site not specified 11/18/2019    Acute UTI     Past Surgical History:   Procedure Laterality Date    FOOT SURGERY  09/12/2017    Foot Surgery    FOOT SURGERY  11/21/2017    Foot Surgery Right    HAND SURGERY  09/12/2017    Hand Surgery                                                                                                                                                          OTHER SURGICAL HISTORY  06/25/2020    Tubal ligation bilateral    SHOULDER SURGERY  09/12/2017    Shoulder Surgery    TONSILLECTOMY  09/12/2017    Tonsillectomy    TOTAL HIP ARTHROPLASTY  09/12/2017    Hip Replacement     Family History   Family history unknown: Yes     Social History     Tobacco Use    Smoking status: Former     Types: Cigarettes    Smokeless tobacco: Never   Vaping Use    Vaping Use: Never used   Substance Use Topics    Alcohol use: Yes     Comment: social    Drug use: Never       Physical Exam   ED Triage Vitals [12/22/23 1756]   Temp Heart Rate Resp BP   36.5 °C (97.7 °F) 98 16 (!) 151/46      SpO2 Temp src Heart Rate Source Patient Position   96 % -- -- --      BP Location FiO2 (%)     -- --       Physical Exam  Vitals and nursing note reviewed.   Constitutional:       Appearance: Normal appearance.   HENT:      Head: Normocephalic and atraumatic.      Mouth/Throat:      Mouth: Mucous membranes are moist.   Eyes:      Extraocular Movements: Extraocular movements  intact.      Pupils: Pupils are equal, round, and reactive to light.   Musculoskeletal:         General: Tenderness (Diffuse left ankle) and deformity (Left ankle) present. Normal range of motion.      Right lower leg: No edema.      Left lower leg: No edema.      Comments: No knee tenderness to palpation.  Limited range of motion of the ankle secondary to deformity   Skin:     General: Skin is warm and dry.      Findings: No lesion or rash.   Neurological:      General: No focal deficit present.      Mental Status: She is alert and oriented to person, place, and time.      Sensory: No sensory deficit.      Motor: No weakness.   Psychiatric:         Behavior: Behavior normal.       Labs Reviewed - No data to display  XR ankle left 3+ views    (Results Pending)     ED Course & MDM   Diagnoses as of 12/22/23 2043   Closed fracture dislocation of left ankle, initial encounter       Medical Decision Making  Differentials include fracture versus dislocation.  Imaging studies, if performed, were independently reviewed and interpreted by myself and confirmed by radiologist. EKG(s), if performed, were interpreted by myself.Patient was given morphine for pain control.  X-rays of the tib-fib and left ankle shows comminuted fracture of the distal fibula as well as fracture to the posterior malleolus of the tibia and medial dislocation off of the talus.  Under procedural sedation, this was reduced by myself.  See procedure note for details.  Repeat x-ray shows improved alignment.  Patient be given crutches and Norco.  She has followed up with Dr. Monreal in the past.  She will follow-up with him next week.        Procedure  Orthopaedic Injury Treatment - Lower Extremity    Performed by: Shar Sepulveda MD  Authorized by: Shar Sepulveda MD    Consent:     Consent obtained:  Written    Consent given by:  Patient    Risks, benefits, and alternatives were discussed: yes      Risks discussed:  Fracture and recurrent dislocation     Alternatives discussed:  No treatment  Universal protocol:     Procedure explained and questions answered to patient or proxy's satisfaction: yes      Patient identity confirmed:  Verbally with patient and arm band  Location:     Location:  Ankle    Ankle injury location:  L ankle    Ankle dislocation type: lateral    Pre-procedure details:     Distal perfusion: normal      Range of motion: reduced    Sedation:     Sedation type:  Moderate sedation  Anesthesia:     Anesthesia method:  None  Procedure details:     Manipulation performed: yes      Ankle reduction method:  Direct traction    Reduction successful: yes      Reduction confirmed with imaging: yes      Immobilization:  Splint    Splint type:  Ankle stirrup    Supplies used:  Fiberglass  Post-procedure details:     Neurological function: normal      Distal perfusion: normal      Range of motion: improved      Procedure completion:  Tolerated well, no immediate complications  Moderate Sedation    Performed by: Shar Sepulveda MD  Authorized by: Shar Sepulveda MD    Consent:     Consent obtained:  Written    Consent given by:  Patient    Risks, benefits, and alternatives were discussed: yes      Risks discussed:  Allergic reaction, prolonged hypoxia resulting in organ damage, prolonged sedation necessitating reversal and nausea    Alternatives discussed:  Analgesia without sedation  Universal protocol:     Procedure explained and questions answered to patient or proxy's satisfaction: yes      Patient identity confirmed:  Verbally with patient and arm band  Indications:     Procedure performed:  Fracture reduction    Procedure necessitating sedation performed by:  Physician performing sedation    Intended level of sedation:  Moderate  Pre-sedation assessment:     ASA classification: class 2 - patient with mild systemic disease      Mouth opening:  3 or more finger widths    Mallampati score:  II - soft palate, uvula, fauces visible    Neck mobility: normal       Pre-sedation assessments completed and reviewed: airway patency, hydration status, nausea/vomiting, pain level and respiratory function      History of difficult intubation: no      Pre-sedation assessment completed:  12/22/2023 7:25 PM  Immediate pre-procedure details:     Reassessment: Patient reassessed immediately prior to procedure      Reviewed: vital signs      Verified: bag valve mask available, emergency equipment available, intubation equipment available, IV patency confirmed, oxygen available and suction available    Procedure details (see MAR for exact dosages):     Sedation start time:  12/22/2023 7:27 PM    Preoxygenation:  Nasal cannula    Sedation:  Propofol    Analgesia:  Morphine    Intra-procedure monitoring:  Blood pressure monitoring, continuous capnometry, frequent LOC assessments, frequent vital sign checks, continuous pulse oximetry and cardiac monitor    Intra-procedure events: none      Sedation end time:  12/22/2023 7:42 PM    Total sedation time (minutes):  15  Post-procedure details:     Post-sedation assessment completed:  12/22/2023 8:48 PM    Attendance: Constant attendance by certified staff until patient recovered      Recovery: Patient returned to pre-procedure baseline      Post-sedation assessments completed and reviewed: airway patency, cardiovascular function, mental status, pain level and respiratory function      Patient is stable for discharge or admission: yes      Procedure completion:  Tolerated well, no immediate complications       Shar Sepulveda MD  12/22/23 2049

## 2023-12-27 ENCOUNTER — HOSPITAL ENCOUNTER (OUTPATIENT)
Dept: RADIOLOGY | Facility: HOSPITAL | Age: 63
Discharge: HOME | End: 2023-12-27
Payer: COMMERCIAL

## 2023-12-27 ENCOUNTER — OFFICE VISIT (OUTPATIENT)
Dept: ORTHOPEDIC SURGERY | Facility: CLINIC | Age: 63
End: 2023-12-27
Payer: COMMERCIAL

## 2023-12-27 ENCOUNTER — PREP FOR PROCEDURE (OUTPATIENT)
Dept: ORTHOPEDIC SURGERY | Facility: CLINIC | Age: 63
End: 2023-12-27

## 2023-12-27 VITALS — WEIGHT: 180 LBS | BODY MASS INDEX: 31.89 KG/M2 | HEIGHT: 63 IN

## 2023-12-27 DIAGNOSIS — S82.852A CLOSED TRIMALLEOLAR FRACTURE OF LEFT ANKLE, INITIAL ENCOUNTER: Primary | ICD-10-CM

## 2023-12-27 DIAGNOSIS — S82.852A TRIMALLEOLAR FRACTURE OF ANKLE, CLOSED, LEFT, INITIAL ENCOUNTER: ICD-10-CM

## 2023-12-27 DIAGNOSIS — M80.879A PATHOLOGICAL FRACTURE OF ANKLE DUE TO SECONDARY OSTEOPOROSIS: ICD-10-CM

## 2023-12-27 PROCEDURE — 1160F RVW MEDS BY RX/DR IN RCRD: CPT | Performed by: SPECIALIST

## 2023-12-27 PROCEDURE — 1036F TOBACCO NON-USER: CPT | Performed by: SPECIALIST

## 2023-12-27 PROCEDURE — 1126F AMNT PAIN NOTED NONE PRSNT: CPT | Performed by: SPECIALIST

## 2023-12-27 PROCEDURE — 73700 CT LOWER EXTREMITY W/O DYE: CPT | Mod: LT

## 2023-12-27 PROCEDURE — 99214 OFFICE O/P EST MOD 30 MIN: CPT | Performed by: SPECIALIST

## 2023-12-27 PROCEDURE — 1159F MED LIST DOCD IN RCRD: CPT | Performed by: SPECIALIST

## 2023-12-27 PROCEDURE — 3008F BODY MASS INDEX DOCD: CPT | Performed by: SPECIALIST

## 2023-12-27 NOTE — PROGRESS NOTES
New problem of left ankle fracture. On 12/22/23 she was walking down the stairs and hit the step wrong and fell. She states her ankle was out of place.   History of Present Illness: Above    Mechanism of injury: Rotational stress with axial load  Date of Injury/Pain: Above  Location of pain: Global left ankle isolated    27 point review of systems negative except what is stated in HPI    On examination of the left ankle/foot:  Nonweightbearing in splint gait  Neutral alignment in splint  Minimal forefoot swelling; minimal forefoot ecchymosis; normal strength with great toe flexion/extension  Tenderness to palpation: Globally around splint none proximal leg or knee.  Neurovascularly intact. Good capillary refill. No sensory deficit to light touch. Normal proprioception. Dorsalis pedis and posterior tibial pulses 2+ contralateral      I personally reviewed the patient's x-ray images and reports of the left ankle/foot. The xrays show displaced trimalleolar ankle fracture    ASSESSMENT: Left trimalleolar ankle fracture.  We are getting a CT scan today to fully identify the nature of the fracture.  I had a long discussion with the patient regarding her condition and treatment options and she wishes to proceed with surgery.  This will be a ORIF left trimalleolar ankle fracture this Friday.  We had a lengthy discussion regarding the risk and benefit of surgery, the alternatives, limitations, and personnel involved. The include but are not limited to infection, persistent pain, instability, nerve injury, blood clots, and medical complications. We also discussed the pre-operative course, surgery itself, and rehabilitation.  Complete informed consent was obtained from the patient.   Shared decision making occurred while obtaining informed consent.     This note was dictated using speech recognition software and was not corrected for spelling or grammatical errors

## 2023-12-28 ENCOUNTER — ANESTHESIA EVENT (OUTPATIENT)
Dept: OPERATING ROOM | Facility: HOSPITAL | Age: 63
End: 2023-12-28
Payer: COMMERCIAL

## 2023-12-28 DIAGNOSIS — I10 PRIMARY HYPERTENSION: Primary | ICD-10-CM

## 2023-12-28 RX ORDER — IRBESARTAN 75 MG/1
75 TABLET ORAL DAILY
Qty: 90 TABLET | Refills: 0 | Status: SHIPPED | OUTPATIENT
Start: 2023-12-28 | End: 2024-04-16

## 2023-12-29 ENCOUNTER — APPOINTMENT (OUTPATIENT)
Dept: CARDIOLOGY | Facility: HOSPITAL | Age: 63
End: 2023-12-29
Payer: COMMERCIAL

## 2023-12-29 ENCOUNTER — HOSPITAL ENCOUNTER (OUTPATIENT)
Facility: HOSPITAL | Age: 63
Setting detail: OUTPATIENT SURGERY
Discharge: HOME | End: 2023-12-29
Attending: SPECIALIST | Admitting: SPECIALIST
Payer: COMMERCIAL

## 2023-12-29 ENCOUNTER — PHARMACY VISIT (OUTPATIENT)
Dept: PHARMACY | Facility: CLINIC | Age: 63
End: 2023-12-29
Payer: MEDICARE

## 2023-12-29 ENCOUNTER — ANESTHESIA (OUTPATIENT)
Dept: OPERATING ROOM | Facility: HOSPITAL | Age: 63
End: 2023-12-29
Payer: COMMERCIAL

## 2023-12-29 ENCOUNTER — APPOINTMENT (OUTPATIENT)
Dept: RADIOLOGY | Facility: HOSPITAL | Age: 63
End: 2023-12-29
Payer: COMMERCIAL

## 2023-12-29 VITALS
DIASTOLIC BLOOD PRESSURE: 80 MMHG | TEMPERATURE: 98.4 F | SYSTOLIC BLOOD PRESSURE: 123 MMHG | RESPIRATION RATE: 16 BRPM | OXYGEN SATURATION: 96 % | HEART RATE: 84 BPM

## 2023-12-29 DIAGNOSIS — S82.852A TRIMALLEOLAR FRACTURE OF ANKLE, CLOSED, LEFT, INITIAL ENCOUNTER: Primary | ICD-10-CM

## 2023-12-29 LAB
ANION GAP SERPL CALC-SCNC: 11 MMOL/L (ref 10–20)
BUN SERPL-MCNC: 16 MG/DL (ref 6–23)
CALCIUM SERPL-MCNC: 9.7 MG/DL (ref 8.6–10.3)
CHLORIDE SERPL-SCNC: 108 MMOL/L (ref 98–107)
CO2 SERPL-SCNC: 27 MMOL/L (ref 21–32)
CREAT SERPL-MCNC: 0.62 MG/DL (ref 0.5–1.05)
ERYTHROCYTE [DISTWIDTH] IN BLOOD BY AUTOMATED COUNT: 12.3 % (ref 11.5–14.5)
GFR SERPL CREATININE-BSD FRML MDRD: >90 ML/MIN/1.73M*2
GLUCOSE SERPL-MCNC: 97 MG/DL (ref 74–99)
HCT VFR BLD AUTO: 36.8 % (ref 36–46)
HGB BLD-MCNC: 12.4 G/DL (ref 12–16)
MCH RBC QN AUTO: 31.7 PG (ref 26–34)
MCHC RBC AUTO-ENTMCNC: 33.7 G/DL (ref 32–36)
MCV RBC AUTO: 94 FL (ref 80–100)
NRBC BLD-RTO: 0 /100 WBCS (ref 0–0)
PLATELET # BLD AUTO: 230 X10*3/UL (ref 150–450)
POTASSIUM SERPL-SCNC: 4.1 MMOL/L (ref 3.5–5.3)
RBC # BLD AUTO: 3.91 X10*6/UL (ref 4–5.2)
SODIUM SERPL-SCNC: 142 MMOL/L (ref 136–145)
WBC # BLD AUTO: 4.9 X10*3/UL (ref 4.4–11.3)

## 2023-12-29 PROCEDURE — 93010 ELECTROCARDIOGRAM REPORT: CPT | Performed by: INTERNAL MEDICINE

## 2023-12-29 PROCEDURE — 36415 COLL VENOUS BLD VENIPUNCTURE: CPT | Performed by: ANESTHESIOLOGY

## 2023-12-29 PROCEDURE — 7100000001 HC RECOVERY ROOM TIME - INITIAL BASE CHARGE: Performed by: SPECIALIST

## 2023-12-29 PROCEDURE — 7100000002 HC RECOVERY ROOM TIME - EACH INCREMENTAL 1 MINUTE: Performed by: SPECIALIST

## 2023-12-29 PROCEDURE — 3600000009 HC OR TIME - EACH INCREMENTAL 1 MINUTE - PROCEDURE LEVEL FOUR: Performed by: SPECIALIST

## 2023-12-29 PROCEDURE — 2500000004 HC RX 250 GENERAL PHARMACY W/ HCPCS (ALT 636 FOR OP/ED): Performed by: NURSE ANESTHETIST, CERTIFIED REGISTERED

## 2023-12-29 PROCEDURE — 2720000007 HC OR 272 NO HCPCS: Performed by: SPECIALIST

## 2023-12-29 PROCEDURE — 93005 ELECTROCARDIOGRAM TRACING: CPT | Mod: 59

## 2023-12-29 PROCEDURE — 2500000005 HC RX 250 GENERAL PHARMACY W/O HCPCS: Performed by: NURSE ANESTHETIST, CERTIFIED REGISTERED

## 2023-12-29 PROCEDURE — 7100000009 HC PHASE TWO TIME - INITIAL BASE CHARGE: Performed by: SPECIALIST

## 2023-12-29 PROCEDURE — 85027 COMPLETE CBC AUTOMATED: CPT | Performed by: ANESTHESIOLOGY

## 2023-12-29 PROCEDURE — 3600000004 HC OR TIME - INITIAL BASE CHARGE - PROCEDURE LEVEL FOUR: Performed by: SPECIALIST

## 2023-12-29 PROCEDURE — 94762 N-INVAS EAR/PLS OXIMTRY CONT: CPT

## 2023-12-29 PROCEDURE — 2500000004 HC RX 250 GENERAL PHARMACY W/ HCPCS (ALT 636 FOR OP/ED): Performed by: SPECIALIST

## 2023-12-29 PROCEDURE — C1713 ANCHOR/SCREW BN/BN,TIS/BN: HCPCS | Performed by: SPECIALIST

## 2023-12-29 PROCEDURE — 2500000004 HC RX 250 GENERAL PHARMACY W/ HCPCS (ALT 636 FOR OP/ED): Performed by: ANESTHESIOLOGY

## 2023-12-29 PROCEDURE — 76000 FLUOROSCOPY <1 HR PHYS/QHP: CPT | Mod: CCI

## 2023-12-29 PROCEDURE — 2500000001 HC RX 250 WO HCPCS SELF ADMINISTERED DRUGS (ALT 637 FOR MEDICARE OP): Performed by: ANESTHESIOLOGY

## 2023-12-29 PROCEDURE — RXMED WILLOW AMBULATORY MEDICATION CHARGE

## 2023-12-29 PROCEDURE — 3700000001 HC GENERAL ANESTHESIA TIME - INITIAL BASE CHARGE: Performed by: SPECIALIST

## 2023-12-29 PROCEDURE — 7100000010 HC PHASE TWO TIME - EACH INCREMENTAL 1 MINUTE: Performed by: SPECIALIST

## 2023-12-29 PROCEDURE — 3700000002 HC GENERAL ANESTHESIA TIME - EACH INCREMENTAL 1 MINUTE: Performed by: SPECIALIST

## 2023-12-29 PROCEDURE — 2780000003 HC OR 278 NO HCPCS: Performed by: SPECIALIST

## 2023-12-29 PROCEDURE — 80048 BASIC METABOLIC PNL TOTAL CA: CPT | Performed by: ANESTHESIOLOGY

## 2023-12-29 DEVICE — SCREW, CORTICAL, SELF-TAPPING, 3.5 X 18 MM, STAINLESS STEEL: Type: IMPLANTABLE DEVICE | Site: ANKLE | Status: FUNCTIONAL

## 2023-12-29 DEVICE — SCREW, CORTICAL, SELF-TAPPING, 3.5 X 26 MM, STAINLESS STEEL: Type: IMPLANTABLE DEVICE | Site: ANKLE | Status: FUNCTIONAL

## 2023-12-29 DEVICE — SCREW, CORTICAL, SELF-TAPPING, 3.5 X 30 MM, STAINLESS STEEL: Type: IMPLANTABLE DEVICE | Site: ANKLE | Status: FUNCTIONAL

## 2023-12-29 DEVICE — IMPLANTABLE DEVICE: Type: IMPLANTABLE DEVICE | Site: ANKLE | Status: FUNCTIONAL

## 2023-12-29 DEVICE — SCREW, CORTICAL, SELF-TAPPING, 3.5 X 14 MM, STAINLESS STEEL: Type: IMPLANTABLE DEVICE | Site: ANKLE | Status: FUNCTIONAL

## 2023-12-29 DEVICE — SCREW LOCKING 3.5 W/RECESS 10: Type: IMPLANTABLE DEVICE | Site: ANKLE | Status: FUNCTIONAL

## 2023-12-29 DEVICE — SCREW, CORTICAL, SELF-TAPPING, 3.5 X 16 MM, STAINLESS STEEL: Type: IMPLANTABLE DEVICE | Site: ANKLE | Status: FUNCTIONAL

## 2023-12-29 DEVICE — SCREW, CORTICAL, SELF-TAPPING, 3.5 X 40 MM, STAINLESS STEEL: Type: IMPLANTABLE DEVICE | Site: ANKLE | Status: FUNCTIONAL

## 2023-12-29 DEVICE — PLATE LCP TUBULAR 1/3 117MM 10H: Type: IMPLANTABLE DEVICE | Site: ANKLE | Status: FUNCTIONAL

## 2023-12-29 DEVICE — SCREW LOCKING 3.5 W/RECESS 12: Type: IMPLANTABLE DEVICE | Site: ANKLE | Status: FUNCTIONAL

## 2023-12-29 RX ORDER — ALBUTEROL SULFATE 0.83 MG/ML
2.5 SOLUTION RESPIRATORY (INHALATION) ONCE
Status: DISCONTINUED | OUTPATIENT
Start: 2023-12-29 | End: 2023-12-29 | Stop reason: HOSPADM

## 2023-12-29 RX ORDER — SODIUM CITRATE AND CITRIC ACID MONOHYDRATE 334; 500 MG/5ML; MG/5ML
30 SOLUTION ORAL ONCE
Status: COMPLETED | OUTPATIENT
Start: 2023-12-29 | End: 2023-12-29

## 2023-12-29 RX ORDER — SCOLOPAMINE TRANSDERMAL SYSTEM 1 MG/1
PATCH, EXTENDED RELEASE TRANSDERMAL AS NEEDED
Status: DISCONTINUED | OUTPATIENT
Start: 2023-12-29 | End: 2023-12-29

## 2023-12-29 RX ORDER — SODIUM CHLORIDE, SODIUM LACTATE, POTASSIUM CHLORIDE, CALCIUM CHLORIDE 600; 310; 30; 20 MG/100ML; MG/100ML; MG/100ML; MG/100ML
50 INJECTION, SOLUTION INTRAVENOUS CONTINUOUS
Status: DISCONTINUED | OUTPATIENT
Start: 2023-12-29 | End: 2023-12-29 | Stop reason: HOSPADM

## 2023-12-29 RX ORDER — ONDANSETRON HYDROCHLORIDE 2 MG/ML
INJECTION, SOLUTION INTRAVENOUS AS NEEDED
Status: DISCONTINUED | OUTPATIENT
Start: 2023-12-29 | End: 2023-12-29

## 2023-12-29 RX ORDER — ONDANSETRON HYDROCHLORIDE 2 MG/ML
4 INJECTION, SOLUTION INTRAVENOUS ONCE AS NEEDED
Status: DISCONTINUED | OUTPATIENT
Start: 2023-12-29 | End: 2023-12-29 | Stop reason: HOSPADM

## 2023-12-29 RX ORDER — HYDROCODONE BITARTRATE AND ACETAMINOPHEN 5; 325 MG/1; MG/1
2 TABLET ORAL EVERY 6 HOURS PRN
Qty: 30 TABLET | Refills: 0 | Status: SHIPPED | OUTPATIENT
Start: 2023-12-29 | End: 2024-04-10 | Stop reason: WASHOUT

## 2023-12-29 RX ORDER — PROPOFOL 10 MG/ML
INJECTION, EMULSION INTRAVENOUS AS NEEDED
Status: DISCONTINUED | OUTPATIENT
Start: 2023-12-29 | End: 2023-12-29

## 2023-12-29 RX ORDER — DEXAMETHASONE SODIUM PHOSPHATE 4 MG/ML
INJECTION, SOLUTION INTRA-ARTICULAR; INTRALESIONAL; INTRAMUSCULAR; INTRAVENOUS; SOFT TISSUE AS NEEDED
Status: DISCONTINUED | OUTPATIENT
Start: 2023-12-29 | End: 2023-12-29

## 2023-12-29 RX ORDER — MIDAZOLAM HYDROCHLORIDE 1 MG/ML
INJECTION, SOLUTION INTRAMUSCULAR; INTRAVENOUS AS NEEDED
Status: DISCONTINUED | OUTPATIENT
Start: 2023-12-29 | End: 2023-12-29

## 2023-12-29 RX ORDER — ROCURONIUM BROMIDE 10 MG/ML
INJECTION, SOLUTION INTRAVENOUS AS NEEDED
Status: DISCONTINUED | OUTPATIENT
Start: 2023-12-29 | End: 2023-12-29

## 2023-12-29 RX ORDER — GLYCOPYRROLATE 0.2 MG/ML
INJECTION INTRAMUSCULAR; INTRAVENOUS AS NEEDED
Status: DISCONTINUED | OUTPATIENT
Start: 2023-12-29 | End: 2023-12-29

## 2023-12-29 RX ORDER — METOCLOPRAMIDE HYDROCHLORIDE 5 MG/ML
10 INJECTION INTRAMUSCULAR; INTRAVENOUS ONCE
Status: COMPLETED | OUTPATIENT
Start: 2023-12-29 | End: 2023-12-29

## 2023-12-29 RX ORDER — BUPIVACAINE HYDROCHLORIDE 2.5 MG/ML
INJECTION, SOLUTION EPIDURAL; INFILTRATION; INTRACAUDAL AS NEEDED
Status: DISCONTINUED | OUTPATIENT
Start: 2023-12-29 | End: 2023-12-29

## 2023-12-29 RX ORDER — LIDOCAINE HCL/PF 100 MG/5ML
SYRINGE (ML) INTRAVENOUS AS NEEDED
Status: DISCONTINUED | OUTPATIENT
Start: 2023-12-29 | End: 2023-12-29

## 2023-12-29 RX ORDER — FAMOTIDINE 10 MG/ML
20 INJECTION INTRAVENOUS ONCE
Status: COMPLETED | OUTPATIENT
Start: 2023-12-29 | End: 2023-12-29

## 2023-12-29 RX ORDER — ONDANSETRON HYDROCHLORIDE 2 MG/ML
4 INJECTION, SOLUTION INTRAVENOUS ONCE
Status: COMPLETED | OUTPATIENT
Start: 2023-12-29 | End: 2023-12-29

## 2023-12-29 RX ORDER — FENTANYL CITRATE 50 UG/ML
INJECTION, SOLUTION INTRAMUSCULAR; INTRAVENOUS AS NEEDED
Status: DISCONTINUED | OUTPATIENT
Start: 2023-12-29 | End: 2023-12-29

## 2023-12-29 RX ORDER — CEFAZOLIN SODIUM 2 G/100ML
2 INJECTION, SOLUTION INTRAVENOUS ONCE
Status: COMPLETED | OUTPATIENT
Start: 2023-12-29 | End: 2023-12-29

## 2023-12-29 RX ORDER — BUPIVACAINE HYDROCHLORIDE 5 MG/ML
INJECTION, SOLUTION EPIDURAL; INTRACAUDAL AS NEEDED
Status: DISCONTINUED | OUTPATIENT
Start: 2023-12-29 | End: 2023-12-29

## 2023-12-29 RX ORDER — LIDOCAINE HYDROCHLORIDE AND EPINEPHRINE 20; 10 MG/ML; UG/ML
INJECTION, SOLUTION INFILTRATION; PERINEURAL AS NEEDED
Status: DISCONTINUED | OUTPATIENT
Start: 2023-12-29 | End: 2023-12-29

## 2023-12-29 RX ADMIN — DEXAMETHASONE SODIUM PHOSPHATE 8 MG: 4 INJECTION INTRA-ARTICULAR; INTRALESIONAL; INTRAMUSCULAR; INTRAVENOUS; SOFT TISSUE at 07:30

## 2023-12-29 RX ADMIN — METOCLOPRAMIDE 10 MG: 5 INJECTION, SOLUTION INTRAMUSCULAR; INTRAVENOUS at 06:40

## 2023-12-29 RX ADMIN — CEFAZOLIN SODIUM 2 G: 2 INJECTION, SOLUTION INTRAVENOUS at 07:48

## 2023-12-29 RX ADMIN — BUPIVACAINE HYDROCHLORIDE 25 ML: 5 INJECTION, SOLUTION EPIDURAL; INTRACAUDAL; PERINEURAL at 07:30

## 2023-12-29 RX ADMIN — SCOLOPAMINE TRANSDERMAL SYSTEM 1 PATCH: 1 PATCH, EXTENDED RELEASE TRANSDERMAL at 07:47

## 2023-12-29 RX ADMIN — LIDOCAINE HYDROCHLORIDE,EPINEPHRINE BITARTRATE 11 ML: 20; .01 INJECTION, SOLUTION INFILTRATION; PERINEURAL at 07:30

## 2023-12-29 RX ADMIN — SODIUM CITRATE AND CITRIC ACID MONOHYDRATE 30 ML: 500; 334 SOLUTION ORAL at 06:40

## 2023-12-29 RX ADMIN — PROPOFOL 200 MG: 10 INJECTION, EMULSION INTRAVENOUS at 07:53

## 2023-12-29 RX ADMIN — MIDAZOLAM HYDROCHLORIDE 2 MG: 1 INJECTION, SOLUTION INTRAMUSCULAR; INTRAVENOUS at 07:30

## 2023-12-29 RX ADMIN — SUGAMMADEX 200 MG: 100 INJECTION, SOLUTION INTRAVENOUS at 09:55

## 2023-12-29 RX ADMIN — SODIUM CHLORIDE, POTASSIUM CHLORIDE, SODIUM LACTATE AND CALCIUM CHLORIDE 50 ML/HR: 600; 310; 30; 20 INJECTION, SOLUTION INTRAVENOUS at 06:40

## 2023-12-29 RX ADMIN — FENTANYL CITRATE 100 MCG: 50 INJECTION, SOLUTION INTRAMUSCULAR; INTRAVENOUS at 07:30

## 2023-12-29 RX ADMIN — BUPIVACAINE HYDROCHLORIDE 13 ML: 2.5 INJECTION, SOLUTION EPIDURAL; INFILTRATION; INTRACAUDAL; PERINEURAL at 07:30

## 2023-12-29 RX ADMIN — LIDOCAINE HYDROCHLORIDE 60 MG: 20 INJECTION INTRAVENOUS at 07:53

## 2023-12-29 RX ADMIN — ONDANSETRON 4 MG: 2 INJECTION, SOLUTION INTRAMUSCULAR; INTRAVENOUS at 09:06

## 2023-12-29 RX ADMIN — ROCURONIUM BROMIDE 50 MG: 10 INJECTION, SOLUTION INTRAVENOUS at 07:53

## 2023-12-29 RX ADMIN — GLYCOPYRROLATE 0.2 MG: 0.2 INJECTION, SOLUTION INTRAMUSCULAR; INTRAVENOUS at 08:31

## 2023-12-29 RX ADMIN — FAMOTIDINE 20 MG: 10 INJECTION, SOLUTION INTRAVENOUS at 06:40

## 2023-12-29 RX ADMIN — DEXAMETHASONE SODIUM PHOSPHATE 4 MG: 4 INJECTION INTRA-ARTICULAR; INTRALESIONAL; INTRAMUSCULAR; INTRAVENOUS; SOFT TISSUE at 07:57

## 2023-12-29 RX ADMIN — ONDANSETRON 4 MG: 2 INJECTION INTRAMUSCULAR; INTRAVENOUS at 06:41

## 2023-12-29 SDOH — HEALTH STABILITY: MENTAL HEALTH: CURRENT SMOKER: 0

## 2023-12-29 ASSESSMENT — COLUMBIA-SUICIDE SEVERITY RATING SCALE - C-SSRS
6. HAVE YOU EVER DONE ANYTHING, STARTED TO DO ANYTHING, OR PREPARED TO DO ANYTHING TO END YOUR LIFE?: NO
1. IN THE PAST MONTH, HAVE YOU WISHED YOU WERE DEAD OR WISHED YOU COULD GO TO SLEEP AND NOT WAKE UP?: NO
2. HAVE YOU ACTUALLY HAD ANY THOUGHTS OF KILLING YOURSELF?: NO

## 2023-12-29 ASSESSMENT — PAIN SCALES - GENERAL
PAINLEVEL_OUTOF10: 1
PAINLEVEL_OUTOF10: 1
PAINLEVEL_OUTOF10: 0 - NO PAIN
PAINLEVEL_OUTOF10: 0 - NO PAIN
PAINLEVEL_OUTOF10: 1
PAINLEVEL_OUTOF10: 1
PAINLEVEL_OUTOF10: 0 - NO PAIN
PAIN_LEVEL: 0
PAINLEVEL_OUTOF10: 1
PAINLEVEL_OUTOF10: 5 - MODERATE PAIN
PAINLEVEL_OUTOF10: 1
PAINLEVEL_OUTOF10: 0 - NO PAIN
PAINLEVEL_OUTOF10: 1
PAINLEVEL_OUTOF10: 0 - NO PAIN
PAINLEVEL_OUTOF10: 0 - NO PAIN

## 2023-12-29 ASSESSMENT — PAIN - FUNCTIONAL ASSESSMENT
PAIN_FUNCTIONAL_ASSESSMENT: 0-10

## 2023-12-29 ASSESSMENT — PAIN DESCRIPTION - DESCRIPTORS
DESCRIPTORS: DULL;SORE
DESCRIPTORS: ACHING;SORE

## 2023-12-29 NOTE — ANESTHESIA PROCEDURE NOTES
Peripheral Block    Patient location during procedure: pre-op  Start time: 12/29/2023 7:30 AM  End time: 12/29/2023 7:45 AM  Reason for block: at surgeon's request and post-op pain management  Staffing  Performed: CRNA   Authorized by: REGINA Mejía    Performed by: REGINA Mejía  Preanesthetic Checklist     Timeout performed at: 12/29/2023 7:29 AM  Peripheral Block  Patient position: laying flat  Prep: ChloraPrep  Patient monitoring: heart rate, cardiac monitor and continuous pulse ox  Block type: other (Saphenous)  Laterality: left  Injection technique: single-shot  Local infiltration: bupivicaine  Infiltration strength: 0.3 %  Dose: 12 mL  Needle  Needle type: IM.   Needle gauge: 26 G  Needle localization: anatomical landmarks  Test dose: negative  Assessment  Injection assessment: negative aspiration for heme, no paresthesia on injection and incremental injection  Paresthesia pain: none  Heart rate change: no  Slow fractionated injection: yes  Additional Notes  See block note #1.  Sterile prep and drape.  Subpatellar Saphenous nerve block placed easily, sterile , X1.  Injected with a mixture of 12 ml 0.25% Bupivicaine plain and 5 ml 2% Lidocaine with epi 1:100,000.  Tolerated very well, excellent early effect.

## 2023-12-29 NOTE — H&P
History Of Present Illness  Amarilis Burger is a 63 y.o. female presenting with left ankle fracture.     Past Medical History  She has a past medical history of Abnormal findings on diagnostic imaging of other specified body structures (07/07/2021), Acute maxillary sinusitis, unspecified (04/28/2021), Arthritis, Asthma, Chronic kidney disease, Delayed emergence from general anesthesia, Dizziness and giddiness (01/06/2021), Encounter for other screening for malignant neoplasm of breast (06/25/2020), GERD (gastroesophageal reflux disease), Hyperlipidemia, Hypertension, Impacted cerumen, right ear (09/12/2017), Other conditions influencing health status (12/07/2020), Other muscle spasm (01/06/2021), Other specified disorders of bladder (11/18/2019), Pain in left foot (06/30/2022), Par[place]usia (11/27/2020), Personal history of (healed) traumatic fracture (07/02/2022), Personal history of other diseases of the musculoskeletal system and connective tissue, Personal history of other diseases of the respiratory system, Personal history of other specified conditions (11/18/2019), Personal history of other specified conditions (06/02/2022), Personal history of other specified conditions (06/21/2022), Personal history of other specified conditions (11/27/2020), Personal history of other specified conditions (07/07/2021), Personal history of urinary calculi (12/01/2017), PONV (postoperative nausea and vomiting), Unspecified otitis externa, unspecified ear (12/20/2021), Urinary tract infection, site not specified (11/18/2019), Urinary tract infection, site not specified (11/18/2019), and Vertigo.    Surgical History  She has a past surgical history that includes Total hip arthroplasty (Right, 09/12/2017); Hand surgery (09/12/2017); Shoulder surgery (09/12/2017); Tonsillectomy (09/12/2017); Foot surgery (09/12/2017); Other surgical history (06/25/2020); and Foot surgery (11/21/2017).     Social History  She reports that she has  quit smoking. Her smoking use included cigarettes. She has never used smokeless tobacco. She reports current alcohol use. She reports that she does not use drugs.    Family History  Family History   Family history unknown: Yes        Allergies  Patient has no known allergies.    Isolated left ankle pain     Alert and oriented x 3  Lungs clear to auscultation  Heart regular rate and rhythm no murmur normal S1-S2  Left lower extremity neurovascular status intact with stable intact splint     Last Recorded Vitals  Blood pressure 127/84, pulse 73, temperature 37 °C (98.6 °F), temperature source Temporal, resp. rate 14, SpO2 95 %.    Relevant Results      Scheduled medications  albuterol, 2.5 mg, nebulization, Once      Continuous medications  lactated Ringer's, 50 mL/hr, Last Rate: 50 mL/hr (12/29/23 0640)      PRN medications  PRN medications: oxygen  Results for orders placed or performed during the hospital encounter of 12/29/23 (from the past 24 hour(s))   CBC   Result Value Ref Range    WBC 4.9 4.4 - 11.3 x10*3/uL    nRBC 0.0 0.0 - 0.0 /100 WBCs    RBC 3.91 (L) 4.00 - 5.20 x10*6/uL    Hemoglobin 12.4 12.0 - 16.0 g/dL    Hematocrit 36.8 36.0 - 46.0 %    MCV 94 80 - 100 fL    MCH 31.7 26.0 - 34.0 pg    MCHC 33.7 32.0 - 36.0 g/dL    RDW 12.3 11.5 - 14.5 %    Platelets 230 150 - 450 x10*3/uL   Basic Metabolic Panel   Result Value Ref Range    Glucose 97 74 - 99 mg/dL    Sodium 142 136 - 145 mmol/L    Potassium 4.1 3.5 - 5.3 mmol/L    Chloride 108 (H) 98 - 107 mmol/L    Bicarbonate 27 21 - 32 mmol/L    Anion Gap 11 10 - 20 mmol/L    Urea Nitrogen 16 6 - 23 mg/dL    Creatinine 0.62 0.50 - 1.05 mg/dL    eGFR >90 >60 mL/min/1.73m*2    Calcium 9.7 8.6 - 10.3 mg/dL       Assessment/Plan   Principal Problem:    Trimalleolar fracture of ankle, closed, left, initial encounter      Patient here for open reduction internal fixation left trimalleolar ankle fracture.  As per my office note a preoperative informed consent was  obtained.       I spent 10 minutes in the professional and overall care of this patient.      Reggie Monreal MD

## 2023-12-29 NOTE — OP NOTE
Open Reduction Internal Fixation Ankle *C-ARM* GENERAL/BLOCK SYNTHES ( PRONE) (L) Operative Note     Date: 2023  OR Location: [facility]    Name: Amarilis Burger, : 1960, Age: 63 y.o., MRN: [id number], Sex: female    Diagnosis  Pre-op Diagnosis     * Trimalleolar fracture of ankle, closed, left, initial encounter [S82.852A] Post-op Diagnosis     * Trimalleolar fracture of ankle, closed, left, initial encounter [S82.852A]     Procedures  Open Reduction Internal Fixation Ankle *C-ARM* GENERAL/BLOCK SYNTHES ( PRONE)  06420 - CT OPEN TX TRIMALLEOLAR ANKLE FX W/O FIXJ PST LIP      Surgeons      * Reggie Monreal - Primary    Resident/Fellow/Other Assistant:  Surgeon(s) and Role:    Procedure Summary  Anesthesia: General  ASA: III  Anesthesia Staff: CRNA: GRACE Mejía-CRNA  Estimated Blood Loss: 5mL  Intra-op Medications: * No intraprocedure medications in log *           Anesthesia Record               Intraprocedure I/O Totals       None           Specimen: No specimens collected     Staff:   Circulator: Trina Akbar RN  Scrub Person: Real Llanes         Drains and/or Catheters: * None in log *    Tourniquet Times:   * Missing tourniquet times found for documented tourniquets in lo *     Implants:  Implants       Type Name Action Serial No.      Screw SCREW, CANNULATED, PARTIAL THREAD, 4.5 X 40 MM - [id number] Implanted      Screw SCREW, CANNULATED, PARTIAL THREAD, 4.5 X 38 MM - [id number] Implanted      Screw SCREW, CORTICAL, SELF-TAPPING, 3.5 X 26 MM, STAINLESS STEEL - [id number] Implanted      Screw SCREW, CORTICAL, SELF-TAPPING, 3.5 X 30 MM, STAINLESS STEEL - [id number] Implanted      Screw SCREW, CORTICAL, SELF-TAPPING, 3.5 X 40 MM, STAINLESS STEEL - [id number] Implanted      Screw SCREW, CORTICAL, SELF-TAPPING, 3.5 X 16 MM, STAINLESS STEEL - [id number] Implanted      Screw PLATE LCP TUBULAR 1/3 57MM 5H - [id number] Implanted      Screw SCREW, CORTICAL, SELF-TAPPING, 3.5 X 18 MM,  STAINLESS STEEL - [id number] Implanted      Screw PLATE LCP TUBULAR 1/3 117MM 10H - [id number] Implanted      Screw SCREW, CORTICAL, SELF-TAPPING, 3.5 X 14 MM, STAINLESS STEEL - [id number] Implanted      Screw SCREW LOCKING 3.5 W/RECESS 10 - [id number] Implanted      Screw SCREW LOCKING 3.5 W/RECESS 12 - [id number] Implanted               Findings: Comminuted lateral malleolar and posterior malleolar fractures anatomically reduced    Indications: Amarilis Burger is an 63 y.o. female who is having surgery for Trimalleolar fracture of ankle, closed, left, initial encounter [S82.080H].  She elected to proceed with open reduction internal fixation    The patient was seen in the preoperative area. The risks, benefits, complications, treatment options, non-operative alternatives, expected recovery and outcomes were discussed with the patient. The possibilities of reaction to medication, pulmonary aspiration, injury to surrounding structures, bleeding, recurrent infection, the need for additional procedures, failure to diagnose a condition, and creating a complication requiring transfusion or operation were discussed with the patient. The patient concurred with the proposed plan, giving informed consent.  The site of surgery was properly noted/marked if necessary per policy. The patient has been actively warmed in preoperative area. Preoperative antibiotics have been ordered and given within 1 hours of incision. Venous thrombosis prophylaxis have been ordered including unilateral sequential compression device    Procedure Details: Procedure Details: Patient brought to the OR and placed prone on the OR table.  Patient was then induced under general anesthetic and given an IV antibiotic.  The operative lower extremity was sterilely prepped and draped in the usual manner both below a thigh tourniquet.  At the start of the case we exsanguinated the extremity with an Esmarch and elevated the tourniquet to 300 mmHg.  With a 15 blade  a longitudinal incision was made over the posterior lateral malleolus/distal fibula, assisted between the fibula and the lateral border of the Achilles tendon.  Appropriate length to accommodate fixation with a plate.  After sharp dissection through the dermis careful blunt dissection to the subcutaneous plane identified the fascia overlying the peroneal muscles and tendons.  This was then incised taking care to protect the peroneal nerve and sural nerve, and the interval between the peroneal tendons and Achilles.  Deep retractors were placed.  Leaving the fascia intact to the distal fibula we then exposed the lateral malleolus fracture and cleaned it of clot and periosteal tissue to fully expose the fracture line.  While maintaining some traction and internal rotation we then anatomically reduced the fracture and held it stably with clamps.  We placed 2 anterior to posterior directed 3 5 cortex lag screws, to accommodate the butterfly fragments and maintain length and rotation of the fibula., using the usual AO technique, to secure the fracture.  We remove the clamps and then fitted a one third tubular plate in the posterior lateral antiglide position.  Screws were then placed in the plate in the usual neutralization technique per AO.  We then obtained standard C arm images and noted anatomic reduction of the fracture and stable internal fixation.  Then turned our attention to the posterior malleolar fracture, dissecting down to the posterior distal tibia in the same interval on the lateral aspect of the peroneal tendons.  With the posterior malleolar fragment exposed we used an osteotome to free it from its compressed elevated position and reduced it back anatomically.  Once again under standard C arm guidance we placed 2 guidepins in the posterior mall fragment posterior to anterior, then secured the fracture in anatomic position with the 245 cannulated screws near the joint line.  We also elected to place a 5  hole one third tubular Synthes plate and a buttress position over the posterior tibia.  We also did a cotton/external rotation stress test under C arm and noted a stable syndesmosis/mortise.  We then copiously irrigated the surgical site with saline.  We closed in layers the fascial layer with a running 2-0 Vicryl.  The subdermal layer with interrupted buried 2-0 Vicryl.  And the skin with vertical mattress 3-0 nylon.  A sterile dressing was then applied along with a very well-padded posterior fiberglass splint maintaining the foot and ankle in neutral.  Tourniquet was let down at the end of the case and good perfusion was noted to the extremity.  Patient was then awoken in stable condition and transferred to recovery.   Complications:  None; patient tolerated the procedure well.    Disposition: PACU - hemodynamically stable.  Condition: stable         Additional Details: None    Attending Attestation: I was present for the entire procedure.    Reggie Monreal  Phone Number: [phone]

## 2023-12-29 NOTE — ANESTHESIA PROCEDURE NOTES
Airway  Date/Time: 12/29/2023 8:07 AM  Urgency: elective    Airway not difficult    Staffing  Performed: CRNA   Authorized by: REGINA Mejía    Performed by: REGINA Mejía  Patient location during procedure: OR    Indications and Patient Condition  Indications for airway management: anesthesia  Spontaneous Ventilation: absent  Sedation level: deep  Preoxygenated: yes  Patient position: sniffing  MILS maintained throughout  Mask difficulty assessment: 1 - vent by mask  No planned trial extubation    Final Airway Details  Final airway type: endotracheal airway      Successful airway: ETT  Cuffed: yes   Successful intubation technique: direct laryngoscopy  Endotracheal tube insertion site: oral  Blade: Preet  Blade size: #3  ETT size (mm): 7.0  Cormack-Lehane Classification: grade IIa - partial view of glottis  Placement verified by: chest auscultation, capnometry and palpation of cuff   Cuff volume (mL): 4  Measured from: teeth  ETT to teeth (cm): 23  Number of attempts at approach: 1  Ventilation between attempts: none  Number of other approaches attempted: 0    Additional Comments  Intubation easy, and atraumatic. Lips and teeth untouched

## 2023-12-29 NOTE — ANESTHESIA POSTPROCEDURE EVALUATION
Patient: Amarilis Burger    Procedure Summary       Date: 12/29/23 Room / Location: [facility] / [facility]    Anesthesia Start: 0753 Anesthesia Stop:     Procedure: Open Reduction Internal Fixation Ankle *C-ARM* GENERAL/BLOCK SYNTHES ( PRONE) (Left: Ankle) Diagnosis:       Trimalleolar fracture of ankle, closed, left, initial encounter      (Trimalleolar fracture of ankle, closed, left, initial encounter [S82.964A])    Surgeons: Reggie Monreal MD Responsible Provider: REGINA Mejía    Anesthesia Type: general ASA Status: 3            Anesthesia Type: general    Vitals Value Taken Time   /69 12/29/23 1013   Temp 97.4 12/29/23 1013   Pulse 81 12/29/23 1013   Resp 17 12/29/23 1013   SpO2 98 12/29/23 1013       Anesthesia Post Evaluation    Patient location during evaluation: PACU  Patient participation: complete - patient cannot participate  Level of consciousness: sleepy but conscious and awake  Pain score: 0  Pain management: adequate  Multimodal analgesia pain management approach  Airway patency: patent  Cardiovascular status: acceptable  Respiratory status: acceptable  Hydration status: acceptable  Postoperative Nausea and Vomiting: none      No notable events documented.

## 2023-12-29 NOTE — ANESTHESIA PREPROCEDURE EVALUATION
Patient: Amarilis Burger    Procedure Information       Date/Time: 12/29/23 0800    Procedure: Open Reduction Internal Fixation Ankle *C-ARM* GENERAL/BLOCK SYNTHES ( PRONE) (Left: Ankle) - GENERAL + BLOCK. MINI C-ARM. SYNTHES SMALL FRAG SET. SYNTHES 4.0 CANULATED AND 4.5 CANULATED SCREWS. 90 MINUTES.    Location: [facility] / [facility]    Surgeons: Reggie Monreal MD            Relevant Problems   Cardiovascular   (+) Hyperlipidemia   (+) Hypertension      Endocrine   (+) Class 1 obesity due to excess calories with serious comorbidity and body mass index (BMI) of 31.0 to 31.9 in adult      GI   (+) GERD (gastroesophageal reflux disease)      /Renal   (+) Calcium kidney stone      Neuro/Psych   (+) Bilateral carpal tunnel syndrome   (+) Lumbar radiculopathy, right      Pulmonary   (+) Moderate persistent asthma without complication      Musculoskeletal   (+) Bilateral carpal tunnel syndrome   (+) Generalized osteoarthritis of multiple sites       Clinical information reviewed:   Tobacco  Allergies  Meds  Problems  Med Hx  Surg Hx  OB Status    Fam Hx  Soc Hx        NPO Detail:  NPO/Void Status  Date of Last Liquid: 12/28/23  Time of Last Liquid: 2300  Date of Last Solid: 12/28/23  Time of Last Solid: 1700  Last Intake Type: Clear fluids         Physical Exam    Airway  Mallampati: II  TM distance: >3 FB  Neck ROM: full     Cardiovascular   Rhythm: regular  Rate: normal     Dental    Pulmonary   Breath sounds clear to auscultation     Abdominal   Abdomen: soft             Anesthesia Plan    ASA 3     general     The patient is not a current smoker.    intravenous induction   Anesthetic plan and risks discussed with patient.    Plan discussed with CRNA.

## 2023-12-29 NOTE — ANESTHESIA PROCEDURE NOTES
"Peripheral Block    Patient location during procedure: pre-op  Start time: 12/29/2023 7:30 AM  End time: 12/29/2023 7:45 AM  Reason for block: at surgeon's request and post-op pain management  Staffing  Performed: CRNA   Authorized by: REGINA Mejía    Performed by: REGINA Mejía  Preanesthetic Checklist  Completed: patient identified, IV checked, site marked, risks and benefits discussed, surgical consent, monitors and equipment checked, pre-op evaluation and timeout performed   Timeout performed at: 12/29/2023 7:29 AM  Peripheral Block  Patient position: prone.  Prep: ChloraPrep  Patient monitoring: heart rate, cardiac monitor and continuous pulse ox  Block type: popliteal  Laterality: left  Injection technique: single-shot  Guidance: nerve stimulator  Local infiltration: bupivicaine  Infiltration strength: 0.5 %  Dose: 23 mL  Needle  Needle type: stimulator.   Needle gauge: 20 G  Needle localization: anatomical landmarks and nerve stimulator  Test dose: negative  Assessment  Injection assessment: negative aspiration for heme, no paresthesia on injection and incremental injection  Paresthesia pain: none  Heart rate change: no  Slow fractionated injection: yes  Additional Notes  Anesthesia consult was placed by  _____Jocelin______ for post procedural analgesia. The patients chart was reviewed and they were deemed an appropriate candidate for the procedure. The patient was educated in detail on the risks, benefits, and alternatives to the block including but not limited to: temporary or permanent nerve damage, bleeding, and infection, damage to surrounding tissues, possible block failure, and the potential for local anesthesia toxicity syndrome. The patient expressed understanding and all questions were answered prior to initiation of the procedure. Informed consent was also signed by the patient and laterality determined per institutional policy. A formal \"time out\" consistent with the " institutions rules and regulations were performed by the anesthesia provider and appropriate RN.    Procedure  The patient was placed in the PRONE/LATERAL/SUPINE position. The LEFT/RIGHT side was marked and skin prep applied and allowed to dry. Proper monitors were applied with oxygen. Sedation was provided by administering:    Versed __2___ mg IV  Fentanyl _100___mcg IV    Pt agreeable to blocks for post operative pain.  Pt positioned herself prone.  Left Popliteal fossa prep and drape.  Popliteal block placed easily, sterile, x1 using PNS down to 0.46 mAmp.  Injected with 6 ml 2% Lidocaine with epi 1:100,000, then 23 ml 0.5% Bupivicaine plain with 8 mg Decadron. Tolerated very well. Excellent early effect.  Pt positioned herself supine for second block.  See block note #2.

## 2023-12-30 LAB
ATRIAL RATE: 70 BPM
P AXIS: 17 DEGREES
PR INTERVAL: 167 MS
Q ONSET: 253 MS
QRS COUNT: 11 BEATS
QRS DURATION: 101 MS
QT INTERVAL: 426 MS
QTC CALCULATION(BAZETT): 460 MS
QTC FREDERICIA: 448 MS
R AXIS: 1 DEGREES
T AXIS: -3 DEGREES
T OFFSET: 466 MS
VENTRICULAR RATE: 70 BPM

## 2024-01-09 ENCOUNTER — OFFICE VISIT (OUTPATIENT)
Dept: ORTHOPEDIC SURGERY | Facility: CLINIC | Age: 64
End: 2024-01-09
Payer: COMMERCIAL

## 2024-01-09 DIAGNOSIS — S82.852A CLOSED TRIMALLEOLAR FRACTURE OF LEFT ANKLE, INITIAL ENCOUNTER: Primary | ICD-10-CM

## 2024-01-09 PROCEDURE — 1126F AMNT PAIN NOTED NONE PRSNT: CPT | Performed by: SPECIALIST

## 2024-01-09 PROCEDURE — 1036F TOBACCO NON-USER: CPT | Performed by: SPECIALIST

## 2024-01-09 PROCEDURE — 3008F BODY MASS INDEX DOCD: CPT | Performed by: SPECIALIST

## 2024-01-09 PROCEDURE — 99024 POSTOP FOLLOW-UP VISIT: CPT | Performed by: SPECIALIST

## 2024-01-09 PROCEDURE — 1159F MED LIST DOCD IN RCRD: CPT | Performed by: SPECIALIST

## 2024-01-09 PROCEDURE — L4361 PNEUMA/VAC WALK BOOT PRE OTS: HCPCS | Performed by: SPECIALIST

## 2024-01-09 NOTE — PROGRESS NOTES
Post op 12/29/2023 ORIF left Trimalleolar ankle fracture - no xrays today   Patient complains of appropriate outside region pain on the left.  No constitutional symptoms.    Exam: Left short leg splint removed.  Her posterior lateral incision is healing well.  No signs of infection.  Medial ankle and hindfoot fracture blisters noted to be stable and without infection.  Dermis intact otherwise neurovascular intact good alignment.    Assessment and plan: Status post ORIF left trimalleolar ankle fracture.  Remain nonweightbearing in fracture boot which was applied today.  She is instructed on daily dressing change and compression wrap.  Will see her back in a week for suture removal no x-rays.

## 2024-01-16 ENCOUNTER — OFFICE VISIT (OUTPATIENT)
Dept: ORTHOPEDIC SURGERY | Facility: CLINIC | Age: 64
End: 2024-01-16
Payer: COMMERCIAL

## 2024-01-16 VITALS — HEIGHT: 63 IN | BODY MASS INDEX: 31.89 KG/M2 | WEIGHT: 180 LBS

## 2024-01-16 DIAGNOSIS — S82.852A CLOSED TRIMALLEOLAR FRACTURE OF LEFT ANKLE, INITIAL ENCOUNTER: Primary | ICD-10-CM

## 2024-01-16 PROCEDURE — 1036F TOBACCO NON-USER: CPT | Performed by: SPECIALIST

## 2024-01-16 PROCEDURE — 99024 POSTOP FOLLOW-UP VISIT: CPT | Performed by: SPECIALIST

## 2024-01-16 PROCEDURE — 1160F RVW MEDS BY RX/DR IN RCRD: CPT | Performed by: SPECIALIST

## 2024-01-16 PROCEDURE — 1126F AMNT PAIN NOTED NONE PRSNT: CPT | Performed by: SPECIALIST

## 2024-01-16 PROCEDURE — 1159F MED LIST DOCD IN RCRD: CPT | Performed by: SPECIALIST

## 2024-01-16 PROCEDURE — 3008F BODY MASS INDEX DOCD: CPT | Performed by: SPECIALIST

## 2024-01-16 NOTE — PROGRESS NOTES
Postop Left ankle ORIF 12/29/2023  Here for suture removal   NWB in a wheelchair, wearing her boot     Exam: Left ankle with decreased swelling good perfusion normal neurovascular status.  Incision is well-healed no drainage no signs of infection.  Her medial fracture blisters are stable dry no signs of infection.  Rest exam is unremarkable with well aligned ankle with good active motion.    Assessment/plan: Status post ORIF left ankle fracture.  Her sutures were removed today and Steri-Strips applied.  Continue nonweightbearing.  May come out of the boot for active range of motion.  Upon further quizzing some of her hypersensitivity appears to be related to history of chronic left-sided sciatica.  Follow-up in 2 weeks for 3 views nonweightbearing x-rays left ankle

## 2024-01-25 DIAGNOSIS — S82.852A CLOSED TRIMALLEOLAR FRACTURE OF LEFT ANKLE, INITIAL ENCOUNTER: ICD-10-CM

## 2024-01-30 ENCOUNTER — OFFICE VISIT (OUTPATIENT)
Dept: ORTHOPEDIC SURGERY | Facility: CLINIC | Age: 64
End: 2024-01-30
Payer: COMMERCIAL

## 2024-01-30 ENCOUNTER — HOSPITAL ENCOUNTER (OUTPATIENT)
Dept: RADIOLOGY | Facility: HOSPITAL | Age: 64
Discharge: HOME | End: 2024-01-30
Payer: COMMERCIAL

## 2024-01-30 VITALS — HEIGHT: 63 IN | WEIGHT: 180 LBS | BODY MASS INDEX: 31.89 KG/M2

## 2024-01-30 DIAGNOSIS — S82.852A CLOSED TRIMALLEOLAR FRACTURE OF LEFT ANKLE, INITIAL ENCOUNTER: ICD-10-CM

## 2024-01-30 DIAGNOSIS — S82.852D CLOSED TRIMALLEOLAR FRACTURE OF LEFT ANKLE WITH ROUTINE HEALING, SUBSEQUENT ENCOUNTER: Primary | ICD-10-CM

## 2024-01-30 PROCEDURE — 73610 X-RAY EXAM OF ANKLE: CPT | Mod: LT

## 2024-01-30 PROCEDURE — 1159F MED LIST DOCD IN RCRD: CPT | Performed by: SPECIALIST

## 2024-01-30 PROCEDURE — 1126F AMNT PAIN NOTED NONE PRSNT: CPT | Performed by: SPECIALIST

## 2024-01-30 PROCEDURE — 99024 POSTOP FOLLOW-UP VISIT: CPT | Performed by: SPECIALIST

## 2024-01-30 PROCEDURE — 1036F TOBACCO NON-USER: CPT | Performed by: SPECIALIST

## 2024-01-30 PROCEDURE — 3008F BODY MASS INDEX DOCD: CPT | Performed by: SPECIALIST

## 2024-01-30 NOTE — PROGRESS NOTES
Postop Left ankle ORIF 12/29/2023   NWB in a boot  Doing better, xrays done today    No constitutional symptoms pain well-controlled    Exam: Left ankle with well-healed incision no signs of infection.  Distal neurovascular intact.  Negative Homans.  Good range of motion of the stable well aligned ankle.    Radiographs: Show well aligned ankle mortise with intact hardware.  Early bone healing.    Assessment/plan: Healing left ORIF ankle fracture.  She is in a fracture boot and over the next 2 weeks can progress to full weightbearing.  This to be as pain allows.  Remove boot at rest for active range of motion.  Follow-up in a month with repeat three-view standing x-rays left ankle and then consider weaning from boot and physical therapy.

## 2024-02-07 ENCOUNTER — TELEPHONE (OUTPATIENT)
Dept: PRIMARY CARE | Facility: CLINIC | Age: 64
End: 2024-02-07
Payer: COMMERCIAL

## 2024-02-07 DIAGNOSIS — K21.9 GASTROESOPHAGEAL REFLUX DISEASE WITHOUT ESOPHAGITIS: ICD-10-CM

## 2024-02-07 DIAGNOSIS — E78.2 MIXED HYPERLIPIDEMIA: ICD-10-CM

## 2024-02-07 RX ORDER — ATORVASTATIN CALCIUM 10 MG/1
10 TABLET, FILM COATED ORAL DAILY
Qty: 90 TABLET | Refills: 3 | Status: SHIPPED | OUTPATIENT
Start: 2024-02-07

## 2024-02-07 RX ORDER — PANTOPRAZOLE SODIUM 40 MG/1
40 TABLET, DELAYED RELEASE ORAL DAILY
Qty: 90 TABLET | Refills: 3 | Status: SHIPPED | OUTPATIENT
Start: 2024-02-07

## 2024-02-07 NOTE — TELEPHONE ENCOUNTER
Rx Refill Request Telephone Encounter    Name:  Amarilis SEVERINO:  [id number]  Medication Name:  Pantoprazole  40 mg          Specific Pharmacy location:  [facility]  Rx Refill Request Telephone Encounter    Name:  Amarilis Burger DOB:  [id number]  Medication Name:  Atorvastatin  10 mg

## 2024-02-23 DIAGNOSIS — S82.852D CLOSED TRIMALLEOLAR FRACTURE OF LEFT ANKLE WITH ROUTINE HEALING, SUBSEQUENT ENCOUNTER: ICD-10-CM

## 2024-02-27 ENCOUNTER — OFFICE VISIT (OUTPATIENT)
Dept: ORTHOPEDIC SURGERY | Facility: CLINIC | Age: 64
End: 2024-02-27
Payer: COMMERCIAL

## 2024-02-27 ENCOUNTER — HOSPITAL ENCOUNTER (OUTPATIENT)
Dept: RADIOLOGY | Facility: HOSPITAL | Age: 64
Discharge: HOME | End: 2024-02-27
Payer: COMMERCIAL

## 2024-02-27 VITALS — HEIGHT: 63 IN | WEIGHT: 180 LBS | BODY MASS INDEX: 31.89 KG/M2

## 2024-02-27 DIAGNOSIS — S82.852D CLOSED TRIMALLEOLAR FRACTURE OF LEFT ANKLE WITH ROUTINE HEALING, SUBSEQUENT ENCOUNTER: ICD-10-CM

## 2024-02-27 PROCEDURE — 3008F BODY MASS INDEX DOCD: CPT | Performed by: SPECIALIST

## 2024-02-27 PROCEDURE — 73610 X-RAY EXAM OF ANKLE: CPT | Mod: LEFT SIDE | Performed by: RADIOLOGY

## 2024-02-27 PROCEDURE — 73610 X-RAY EXAM OF ANKLE: CPT | Mod: LT

## 2024-02-27 PROCEDURE — 1126F AMNT PAIN NOTED NONE PRSNT: CPT | Performed by: SPECIALIST

## 2024-02-27 PROCEDURE — 99024 POSTOP FOLLOW-UP VISIT: CPT | Performed by: SPECIALIST

## 2024-02-27 PROCEDURE — 1036F TOBACCO NON-USER: CPT | Performed by: SPECIALIST

## 2024-02-27 PROCEDURE — 1159F MED LIST DOCD IN RCRD: CPT | Performed by: SPECIALIST

## 2024-02-27 NOTE — LETTER
February 27, 2024     Patient: Amarilis Burger   YOB: 1960   Date of Visit: 2/27/2024       To Whom It May Concern:    It is my medical opinion that Amarilis Burger  can return to office starting on 03/05/2024. Patient is only able to work 2 days in office a week, desk work only,  limit standing and walking to 3 hours max per shift, no climbing and lifting more than 5lbs. These restrictions will stay in place until re-assessed at next office visit on 04/16/2024.  .    If you have any questions or concerns, please don't hesitate to call.         Sincerely,        Reggie Monreal MD    CC: No Recipients

## 2024-02-27 NOTE — PROGRESS NOTES
Postop Left ankle ORIF 12/29/23  Walking well in her boot, using a walker.   Xrays repeated    Exam: Left ankle with decreased swelling and well-healed incisions.  Normal alignment.  Passive motion 5 degrees dorsiflexion 30 degrees plantarflexion with mild discomfort.  Gentle stress testing shows a stable ankle.  Distal neurovascular status intact no other acute changes.    Radiographs: 3 view standing left ankle show a stable ankle mortise intact hardware and progressive bone healing with anatomically reduced fractures.    Assessment/plan: Healing left ankle fracture.  This pilon variant once again puts her at slight risk of posttraumatic arthritis in the future and certainly has a lengthy recovery.  We gave her prescription to start physical therapy within the next 1 to 2 weeks where they can start weaning from her boot as tolerated.  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

## 2024-03-08 ENCOUNTER — TELEPHONE (OUTPATIENT)
Dept: ORTHOPEDIC SURGERY | Facility: CLINIC | Age: 64
End: 2024-03-08
Payer: COMMERCIAL

## 2024-03-08 NOTE — TELEPHONE ENCOUNTER
Lexi called in asking if she can have a new note stating she can work 2-3 days a week until her next appointment in April.  She would like this to start next week.  She is asking if we can just put her note to her my chart.

## 2024-04-08 DIAGNOSIS — S82.852D CLOSED TRIMALLEOLAR FRACTURE OF LEFT ANKLE WITH ROUTINE HEALING, SUBSEQUENT ENCOUNTER: ICD-10-CM

## 2024-04-09 ENCOUNTER — LAB (OUTPATIENT)
Dept: LAB | Facility: LAB | Age: 64
End: 2024-04-09
Payer: COMMERCIAL

## 2024-04-09 DIAGNOSIS — Z23 FLU VACCINE NEED: ICD-10-CM

## 2024-04-09 DIAGNOSIS — M25.50 POLYARTHRALGIA: ICD-10-CM

## 2024-04-09 DIAGNOSIS — E66.09 CLASS 1 OBESITY DUE TO EXCESS CALORIES WITH SERIOUS COMORBIDITY AND BODY MASS INDEX (BMI) OF 31.0 TO 31.9 IN ADULT: ICD-10-CM

## 2024-04-09 DIAGNOSIS — J45.40 MODERATE PERSISTENT ASTHMA WITHOUT COMPLICATION (HHS-HCC): ICD-10-CM

## 2024-04-09 DIAGNOSIS — R42 VERTIGO: ICD-10-CM

## 2024-04-09 DIAGNOSIS — K21.9 GASTROESOPHAGEAL REFLUX DISEASE WITHOUT ESOPHAGITIS: ICD-10-CM

## 2024-04-09 DIAGNOSIS — I10 PRIMARY HYPERTENSION: ICD-10-CM

## 2024-04-09 LAB
ALBUMIN SERPL BCP-MCNC: 4.2 G/DL (ref 3.4–5)
ALP SERPL-CCNC: 52 U/L (ref 33–136)
ALT SERPL W P-5'-P-CCNC: 19 U/L (ref 7–45)
ANION GAP SERPL CALC-SCNC: 9 MMOL/L (ref 10–20)
AST SERPL W P-5'-P-CCNC: 15 U/L (ref 9–39)
BILIRUB SERPL-MCNC: 0.4 MG/DL (ref 0–1.2)
BUN SERPL-MCNC: 17 MG/DL (ref 6–23)
CALCIUM SERPL-MCNC: 9 MG/DL (ref 8.6–10.3)
CHLORIDE SERPL-SCNC: 108 MMOL/L (ref 98–107)
CHOLEST SERPL-MCNC: 166 MG/DL (ref 0–199)
CHOLESTEROL/HDL RATIO: 2.8
CK SERPL-CCNC: 50 U/L (ref 0–215)
CO2 SERPL-SCNC: 27 MMOL/L (ref 21–32)
CREAT SERPL-MCNC: 0.64 MG/DL (ref 0.5–1.05)
EGFRCR SERPLBLD CKD-EPI 2021: >90 ML/MIN/1.73M*2
GLUCOSE SERPL-MCNC: 89 MG/DL (ref 74–99)
HDLC SERPL-MCNC: 59.2 MG/DL
LDLC SERPL CALC-MCNC: 83 MG/DL
NON HDL CHOLESTEROL: 107 MG/DL (ref 0–149)
POTASSIUM SERPL-SCNC: 4.1 MMOL/L (ref 3.5–5.3)
PROT SERPL-MCNC: 6.7 G/DL (ref 6.4–8.2)
SODIUM SERPL-SCNC: 140 MMOL/L (ref 136–145)
TRIGL SERPL-MCNC: 120 MG/DL (ref 0–149)
VLDL: 24 MG/DL (ref 0–40)

## 2024-04-09 PROCEDURE — 36415 COLL VENOUS BLD VENIPUNCTURE: CPT

## 2024-04-09 PROCEDURE — 82550 ASSAY OF CK (CPK): CPT

## 2024-04-09 PROCEDURE — 80053 COMPREHEN METABOLIC PANEL: CPT

## 2024-04-09 PROCEDURE — 80061 LIPID PANEL: CPT

## 2024-04-10 ENCOUNTER — OFFICE VISIT (OUTPATIENT)
Dept: PRIMARY CARE | Facility: CLINIC | Age: 64
End: 2024-04-10
Payer: COMMERCIAL

## 2024-04-10 VITALS
HEART RATE: 62 BPM | SYSTOLIC BLOOD PRESSURE: 112 MMHG | WEIGHT: 181 LBS | BODY MASS INDEX: 32.07 KG/M2 | DIASTOLIC BLOOD PRESSURE: 78 MMHG | HEIGHT: 63 IN

## 2024-04-10 DIAGNOSIS — E78.2 MIXED HYPERLIPIDEMIA: ICD-10-CM

## 2024-04-10 DIAGNOSIS — I10 PRIMARY HYPERTENSION: ICD-10-CM

## 2024-04-10 DIAGNOSIS — E66.09 CLASS 1 OBESITY DUE TO EXCESS CALORIES WITH SERIOUS COMORBIDITY AND BODY MASS INDEX (BMI) OF 32.0 TO 32.9 IN ADULT: Primary | ICD-10-CM

## 2024-04-10 DIAGNOSIS — K21.9 GASTROESOPHAGEAL REFLUX DISEASE WITHOUT ESOPHAGITIS: ICD-10-CM

## 2024-04-10 DIAGNOSIS — E66.09 CLASS 1 OBESITY DUE TO EXCESS CALORIES WITH SERIOUS COMORBIDITY AND BODY MASS INDEX (BMI) OF 31.0 TO 31.9 IN ADULT: ICD-10-CM

## 2024-04-10 DIAGNOSIS — J45.40 MODERATE PERSISTENT ASTHMA WITHOUT COMPLICATION (HHS-HCC): ICD-10-CM

## 2024-04-10 PROBLEM — Z23 FLU VACCINE NEED: Status: RESOLVED | Noted: 2023-10-10 | Resolved: 2024-04-10

## 2024-04-10 PROCEDURE — 1036F TOBACCO NON-USER: CPT | Performed by: INTERNAL MEDICINE

## 2024-04-10 PROCEDURE — 1160F RVW MEDS BY RX/DR IN RCRD: CPT | Performed by: INTERNAL MEDICINE

## 2024-04-10 PROCEDURE — 3008F BODY MASS INDEX DOCD: CPT | Performed by: INTERNAL MEDICINE

## 2024-04-10 PROCEDURE — 99213 OFFICE O/P EST LOW 20 MIN: CPT | Performed by: INTERNAL MEDICINE

## 2024-04-10 PROCEDURE — 3074F SYST BP LT 130 MM HG: CPT | Performed by: INTERNAL MEDICINE

## 2024-04-10 PROCEDURE — 1159F MED LIST DOCD IN RCRD: CPT | Performed by: INTERNAL MEDICINE

## 2024-04-10 PROCEDURE — 3078F DIAST BP <80 MM HG: CPT | Performed by: INTERNAL MEDICINE

## 2024-04-10 ASSESSMENT — ANXIETY QUESTIONNAIRES
3. WORRYING TOO MUCH ABOUT DIFFERENT THINGS: NOT AT ALL
GAD7 TOTAL SCORE: 0
6. BECOMING EASILY ANNOYED OR IRRITABLE: NOT AT ALL
5. BEING SO RESTLESS THAT IT IS HARD TO SIT STILL: NOT AT ALL
4. TROUBLE RELAXING: NOT AT ALL
2. NOT BEING ABLE TO STOP OR CONTROL WORRYING: NOT AT ALL
7. FEELING AFRAID AS IF SOMETHING AWFUL MIGHT HAPPEN: NOT AT ALL
IF YOU CHECKED OFF ANY PROBLEMS ON THIS QUESTIONNAIRE, HOW DIFFICULT HAVE THESE PROBLEMS MADE IT FOR YOU TO DO YOUR WORK, TAKE CARE OF THINGS AT HOME, OR GET ALONG WITH OTHER PEOPLE: NOT DIFFICULT AT ALL
1. FEELING NERVOUS, ANXIOUS, OR ON EDGE: NOT AT ALL

## 2024-04-10 ASSESSMENT — COLUMBIA-SUICIDE SEVERITY RATING SCALE - C-SSRS
1. IN THE PAST MONTH, HAVE YOU WISHED YOU WERE DEAD OR WISHED YOU COULD GO TO SLEEP AND NOT WAKE UP?: NO
2. HAVE YOU ACTUALLY HAD ANY THOUGHTS OF KILLING YOURSELF?: NO
6. HAVE YOU EVER DONE ANYTHING, STARTED TO DO ANYTHING, OR PREPARED TO DO ANYTHING TO END YOUR LIFE?: NO

## 2024-04-10 ASSESSMENT — ENCOUNTER SYMPTOMS
OCCASIONAL FEELINGS OF UNSTEADINESS: 0
DEPRESSION: 0
LOSS OF SENSATION IN FEET: 0

## 2024-04-10 NOTE — ASSESSMENT & PLAN NOTE
LDL cholesterol 83 with HDL 59 triglyceride level 120 optimal no evidence of myositis continue atorvastatin 10 mg daily

## 2024-04-10 NOTE — PROGRESS NOTES
"Subjective   Patient ID: Amarilis Burger is a 63 y.o. female who presents for Follow-up.    HPI     Review of Systems    Objective   /78 (BP Location: Left arm, Patient Position: Sitting)   Pulse 62   Ht 1.6 m (5' 3\")   Wt 82.1 kg (181 lb)   LMP  (LMP Unknown)   BMI 32.06 kg/m²     Physical Exam    Assessment/Plan          "

## 2024-04-10 NOTE — PATIENT INSTRUCTIONS

## 2024-04-10 NOTE — PROGRESS NOTES
"Subjective   Reason for Visit: Amarilis Burger is an 63 y.o. female here for a visit.     Past Medical, Surgical, and Family History reviewed and updated in chart.    Reviewed all medications by prescribing practitioner or clinical pharmacist (such as prescriptions, OTCs, herbal therapies and supplements) and documented in the medical record.    HPI    Patient Care Team:  Reinaldo Nettles DO as PCP - General     Review of Systems   All other systems reviewed and are negative.      Objective   Vitals:  /78 (BP Location: Left arm, Patient Position: Sitting)   Pulse 62   Ht 1.6 m (5' 3\")   Wt 82.1 kg (181 lb)   LMP  (LMP Unknown)   BMI 32.06 kg/m²       Physical Exam  Constitutional:       Appearance: She is obese.         Assessment/Plan   Problem List Items Addressed This Visit       GERD (gastroesophageal reflux disease)    Current Assessment & Plan     Stable on pantoprazole 40 mg daily with use of famotidine at night         Relevant Orders    Follow Up In [facility] - PCP - Established    Comprehensive metabolic panel    Lipid Panel    Hyperlipidemia    Current Assessment & Plan     LDL cholesterol 83 with HDL 59 triglyceride level 120 optimal no evidence of myositis continue atorvastatin 10 mg daily         Hypertension    Current Assessment & Plan     Blood pressure stable on irbesartan 75 mg daily continue         Relevant Orders    Follow Up In [facility] - PCP - Established    Comprehensive metabolic panel    Lipid Panel    Moderate persistent asthma without complication    Current Assessment & Plan     Well-controlled at this time continue medications not using rescue inhaler         Relevant Orders    Follow Up In [facility] - PCP - Established    Comprehensive metabolic panel    Lipid Panel    Class 1 obesity due to excess calories with serious comorbidity and body mass index (BMI) of 32.0 to 32.9 in adult - Primary    Current Assessment & Plan     Patient " recently sustained a trimalleolar fracture of her left leg with continued healing will work on dietary factors continues with rehabilitative therapy stable BMI goal less than 30         Relevant Orders    Follow Up In [facility] - PCP - Established    Comprehensive metabolic panel    Lipid Panel        Patient was identified as a fall risk. Risk prevention instructions provided.

## 2024-04-10 NOTE — ASSESSMENT & PLAN NOTE
Patient recently sustained a trimalleolar fracture of her left leg with continued healing will work on dietary factors continues with rehabilitative therapy stable BMI goal less than 30

## 2024-04-10 NOTE — ASSESSMENT & PLAN NOTE
Surgery completed December 29 following with orthopedic surgery and outpatient physical therapy wearing boot at this time doing well

## 2024-04-15 DIAGNOSIS — I10 PRIMARY HYPERTENSION: ICD-10-CM

## 2024-04-16 ENCOUNTER — HOSPITAL ENCOUNTER (OUTPATIENT)
Dept: RADIOLOGY | Facility: HOSPITAL | Age: 64
Discharge: HOME | End: 2024-04-16
Payer: COMMERCIAL

## 2024-04-16 ENCOUNTER — OFFICE VISIT (OUTPATIENT)
Dept: ORTHOPEDIC SURGERY | Facility: CLINIC | Age: 64
End: 2024-04-16
Payer: COMMERCIAL

## 2024-04-16 DIAGNOSIS — S82.852D CLOSED TRIMALLEOLAR FRACTURE OF LEFT ANKLE WITH ROUTINE HEALING, SUBSEQUENT ENCOUNTER: ICD-10-CM

## 2024-04-16 DIAGNOSIS — S82.852D CLOSED TRIMALLEOLAR FRACTURE OF LEFT ANKLE WITH ROUTINE HEALING, SUBSEQUENT ENCOUNTER: Primary | ICD-10-CM

## 2024-04-16 PROCEDURE — 73610 X-RAY EXAM OF ANKLE: CPT | Mod: LT

## 2024-04-16 PROCEDURE — 3008F BODY MASS INDEX DOCD: CPT | Performed by: SPECIALIST

## 2024-04-16 PROCEDURE — 1160F RVW MEDS BY RX/DR IN RCRD: CPT | Performed by: SPECIALIST

## 2024-04-16 PROCEDURE — 1159F MED LIST DOCD IN RCRD: CPT | Performed by: SPECIALIST

## 2024-04-16 PROCEDURE — 99213 OFFICE O/P EST LOW 20 MIN: CPT | Performed by: SPECIALIST

## 2024-04-16 RX ORDER — IRBESARTAN 75 MG/1
75 TABLET ORAL DAILY
Qty: 90 TABLET | Refills: 0 | Status: SHIPPED | OUTPATIENT
Start: 2024-04-16

## 2024-04-16 NOTE — PROGRESS NOTES
Postop Left ankle ORIF 12/29/23 - xrays done today she still has some pain but improved and is fully weightbearing just recently weaning from boot.  She has been working with physical therapy advancing    Exam: Left ankle with decreased swelling intact well-healed incisions.  Good alignment good active motion 5 degrees dorsiflexion 40 degrees plantarflexion.  Mild pain to palpation mostly posterior but stable stress testing of the joint with intact dermis normal neurovascular status and negative Homans.    Radiographs 3 view standing left ankle show intact hardware a stable ankle mortise and well-healed fractures.    Assessment plan: Status post ORIF left ankle fracture, Tri mall, doing well.  She once again understands this is a process of healing over the course of a year and she is progressing well.  Addendum: On exam she has some midfoot pain more than ankle and states she has had some arthritis issues in the past.  I told her at her next visit in 3 months we will do three-view standing x-rays of the left foot and an AP standing ankle (Ortho standing 4 view series foot and ankle).  She may benefit from an orthotic or other options for the midfoot in the future.

## 2024-04-16 NOTE — LETTER
April 16, 2024     Patient: Amarilis Burger   YOB: 1960   Date of Visit: 4/16/2024       To Whom It May Concern:    It is my medical opinion that Amarilis Burger estimated full duty work on 04/01/2024.     If you have any questions or concerns, please don't hesitate to call.         Sincerely,        Reggie Monreal MD    CC: No Recipients

## 2024-06-17 DIAGNOSIS — K21.9 GASTROESOPHAGEAL REFLUX DISEASE WITHOUT ESOPHAGITIS: ICD-10-CM

## 2024-06-17 RX ORDER — FAMOTIDINE 40 MG/1
TABLET, FILM COATED ORAL
Qty: 90 TABLET | Refills: 3 | Status: SHIPPED | OUTPATIENT
Start: 2024-06-17

## 2024-06-17 NOTE — TELEPHONE ENCOUNTER
----- Message from Amarilis Burger sent at 6/17/2024  9:17 AM EDT -----  Regarding: Famatodine  Contact: [phone]  Good morning Dr. Nettles's office.    Could I please get a refill for my Famatodine? 40 MG     Contact [phone] if any quesitons.    Thank you and have a great week!    Lexi Burger

## 2024-06-18 ENCOUNTER — APPOINTMENT (OUTPATIENT)
Dept: CARDIOLOGY | Facility: CLINIC | Age: 64
End: 2024-06-18
Payer: COMMERCIAL

## 2024-06-18 VITALS
SYSTOLIC BLOOD PRESSURE: 124 MMHG | BODY MASS INDEX: 32.6 KG/M2 | HEART RATE: 65 BPM | WEIGHT: 184 LBS | DIASTOLIC BLOOD PRESSURE: 76 MMHG | HEIGHT: 63 IN

## 2024-06-18 DIAGNOSIS — I10 HYPERTENSION, UNSPECIFIED TYPE: Primary | ICD-10-CM

## 2024-06-18 DIAGNOSIS — I25.3 ATRIAL SEPTAL ANEURYSM: ICD-10-CM

## 2024-06-18 DIAGNOSIS — E78.2 MIXED HYPERLIPIDEMIA: ICD-10-CM

## 2024-06-18 DIAGNOSIS — R00.2 PALPITATIONS: ICD-10-CM

## 2024-06-18 PROCEDURE — 1159F MED LIST DOCD IN RCRD: CPT | Performed by: INTERNAL MEDICINE

## 2024-06-18 PROCEDURE — 3008F BODY MASS INDEX DOCD: CPT | Performed by: INTERNAL MEDICINE

## 2024-06-18 PROCEDURE — 3074F SYST BP LT 130 MM HG: CPT | Performed by: INTERNAL MEDICINE

## 2024-06-18 PROCEDURE — 99214 OFFICE O/P EST MOD 30 MIN: CPT | Performed by: INTERNAL MEDICINE

## 2024-06-18 PROCEDURE — 1036F TOBACCO NON-USER: CPT | Performed by: INTERNAL MEDICINE

## 2024-06-18 PROCEDURE — 93000 ELECTROCARDIOGRAM COMPLETE: CPT | Performed by: INTERNAL MEDICINE

## 2024-06-18 PROCEDURE — 3078F DIAST BP <80 MM HG: CPT | Performed by: INTERNAL MEDICINE

## 2024-06-18 NOTE — PROGRESS NOTES
"Chief Complaint:   No chief complaint on file.     History Of Present Illness:    Amarilis Burger is a 64 y.o. female presenting for yearly follow up   h/o HTN, DL, fast heart rates, atrial septal aneurysm, seasonal allergies, fibromyalgia, GERD who presents for follow up.     Doing well.  Still having intermittent episodes of palps, but not long, no assoc LH/dizziness, presyncope.    Denies chest pain, Sob, LE edema, orthopnea.  Activity current limited due to L ankle fracture in 12/23.    ROS:  The remainder of the review of systems was obtained, as was negative as pertains to the chief complaint.    Prior hx:  The patient had COVID in 12/2020. Still not feeling well c/ feeling tired. Has a history of palpitations in 6/2019 wore a monitor at that time showed predominant SR high of 156 low 50 av 74. PVC burden 1%, PAC burden <1%. PSVT up to 17 seconds. States that palpitations resolved at that time. Over the past few months palpitations , racing heart that are intermittent. Feels them in her chest, lasting from 30 seconds ot 5 mins. Also reports SRINIVASAN, but no chest pain/pressure, presyncope, syncope.      CV testing :  Lipid labs 4/2024  chol 166 HDL 59.2  LDL 83  trig 120  CMP K 4.1  BUN 17  crea 0.64  alt 19  ast 15  Stress test 5/2022   negative stress echo   TTE 5/2022  EF 60% stage I DD, atrial septal aneurysm, bubble study was negative, , trivial aortic valve regurg, trace mitral and tricuspid valve regurg,   Last Recorded Vitals:  Vitals:    06/18/24 0903   BP: 124/76   Pulse: 65   Weight: 83.5 kg (184 lb)   Height: 1.6 m (5' 3\")       Past Medical History:  She has a past medical history of Abnormal findings on diagnostic imaging of other specified body structures (07/07/2021), Acute maxillary sinusitis, unspecified (04/28/2021), Arthritis, Asthma ([facility]), Chronic kidney disease, Delayed emergence from general anesthesia, Dizziness and giddiness (01/06/2021), Encounter for other screening for malignant " neoplasm of breast (06/25/2020), GERD (gastroesophageal reflux disease), Hyperlipidemia, Hypertension, Impacted cerumen, right ear (09/12/2017), Other conditions influencing health status (12/07/2020), Other muscle spasm (01/06/2021), Other specified disorders of bladder (11/18/2019), Pain in left foot (06/30/2022), Parageusia (11/27/2020), Personal history of (healed) traumatic fracture (07/02/2022), Personal history of other diseases of the musculoskeletal system and connective tissue, Personal history of other diseases of the respiratory system, Personal history of other specified conditions (11/18/2019), Personal history of other specified conditions (06/02/2022), Personal history of other specified conditions (06/21/2022), Personal history of other specified conditions (11/27/2020), Personal history of other specified conditions (07/07/2021), Personal history of urinary calculi (12/01/2017), PONV (postoperative nausea and vomiting), Unspecified otitis externa, unspecified ear (12/20/2021), Urinary tract infection, site not specified (11/18/2019), Urinary tract infection, site not specified (11/18/2019), and Vertigo.    Past Surgical History:  She has a past surgical history that includes Total hip arthroplasty (Right, 09/12/2017); Hand surgery (09/12/2017); Shoulder surgery (09/12/2017); Tonsillectomy (09/12/2017); Foot surgery (09/12/2017); Other surgical history (06/25/2020); Foot surgery (11/21/2017); and Ankle fracture surgery (Left, 12/29/2023).      Social History:  She reports that she has quit smoking. Her smoking use included cigarettes. She has never used smokeless tobacco. She reports current alcohol use. She reports that she does not use drugs.    Family History:  Family History   Family history unknown: Yes        Allergies:  Patient has no known allergies.    Outpatient Medications:  Current Outpatient Medications   Medication Instructions    albuterol 90 mcg/actuation inhaler INHALE 2 PUFFS BY  MOUTH AS INSTRUCTED EVERY 4 HOURS AS NEEDED    aspirin 81 mg EC tablet 1 tablet, oral, Daily    atorvastatin (LIPITOR) 10 mg, oral, Daily    cetirizine (ZyrTEC) 10 mg tablet 1 tablet, oral, Daily    ergocalciferol (VITAMIN D-2) 1.25 mg, oral, Once Weekly    famotidine (Pepcid) 40 mg tablet TAKE ONE TABLET BY MOUTH DAILY AS NEEDED FOR HEARTBURN    ipratropium (Atrovent) 42 mcg (0.06 %) nasal spray INSTILL 2 SPRAYS INTO EACH NOSTRIL THREE TIMES A DAY    irbesartan (AVAPRO) 75 mg, oral, Daily    Lactobacillus acidophilus/FOS (Lactobac acidoph-fructooligos) 500 million cell-50 mg tablet 50 mg, oral, Daily    loratadine (Claritin) 10 mg tablet 1 tablet, oral, Daily PRN    meclizine (ANTIVERT) 25 mg, oral, 3 times daily PRN    pantoprazole (PROTONIX) 40 mg, oral, Daily    simethicone (MYLICON,GAS-X) 125 mg, oral, 4 times daily    Symbicort 160-4.5 mcg/actuation inhaler INHALE 2 PUFFS INTO THE LUNGS TWICE DAILY AS DIRECTED       Physical Exam:  Physical Exam  HENT:      Mouth/Throat:      Mouth: Mucous membranes are moist.   Eyes:      Extraocular Movements: Extraocular movements intact.   Cardiovascular:      Rate and Rhythm: Normal rate and regular rhythm.      Comments: No significant carotid bruits  Pulmonary:      Effort: Pulmonary effort is normal.   Abdominal:      Palpations: Abdomen is soft.   Skin:     General: Skin is warm.   Neurological:      General: No focal deficit present.      Mental Status: She is alert.   Psychiatric:         Mood and Affect: Mood normal.              Last Labs:  CBC -  Lab Results   Component Value Date    WBC 4.9 12/29/2023    HGB 12.4 12/29/2023    HCT 36.8 12/29/2023    MCV 94 12/29/2023     12/29/2023       CMP -  Lab Results   Component Value Date    CALCIUM 9.0 04/09/2024    PROT 6.7 04/09/2024    ALBUMIN 4.2 04/09/2024    AST 15 04/09/2024    ALT 19 04/09/2024    ALKPHOS 52 04/09/2024    BILITOT 0.4 04/09/2024       LIPID PANEL -   Lab Results   Component Value Date     "CHOL 166 04/09/2024    TRIG 120 04/09/2024    HDL 59.2 04/09/2024    CHHDL 2.8 04/09/2024    LDLF 80 07/12/2023    VLDL 24 04/09/2024    NHDL 107 04/09/2024       RENAL FUNCTION PANEL -   Lab Results   Component Value Date    GLUCOSE 89 04/09/2024     04/09/2024    K 4.1 04/09/2024     (H) 04/09/2024    CO2 27 04/09/2024    ANIONGAP 9 (L) 04/09/2024    BUN 17 04/09/2024    CREATININE 0.64 04/09/2024    CALCIUM 9.0 04/09/2024    ALBUMIN 4.2 04/09/2024        No results found for: \"BNP\", \"HGBA1C\"              Assessment/Plan   Palpitations, unclear etiolgoy. Had event montior in 2019 demonstrating PAC/PVC's and paroxysmal SVT up to 17 seconds.   SRINIVASAN - FIDEL neg for ischemia 5/22  Atrial septal aneurysm  HTN  DL    Doing well, still with occ palps    Plan:  -ASA 81mg daily  -to let us know if palps worsen or more frequent/fast heart rates  -atorvastatin  -continue irbesartan for HTN  "

## 2024-06-18 NOTE — PATIENT INSTRUCTIONS
Thank you for following up with us in office today.    1) let us know if you have long episodes or more frequent episodes of irregular or fast heart rates    2)Please continue the remainder of your medications as prescribed    If you have any questions, please contact the office and leave a message for Dr. Godinez's nurse, Arlene Jung.  [phone]  Please follow up with  [facility] in 6 months  Please follow up with Dr. Godinez in one year

## 2024-07-10 DIAGNOSIS — S82.852D CLOSED TRIMALLEOLAR FRACTURE OF LEFT ANKLE WITH ROUTINE HEALING, SUBSEQUENT ENCOUNTER: ICD-10-CM

## 2024-07-16 ENCOUNTER — APPOINTMENT (OUTPATIENT)
Dept: ORTHOPEDIC SURGERY | Facility: CLINIC | Age: 64
End: 2024-07-16
Payer: COMMERCIAL

## 2024-07-16 ENCOUNTER — HOSPITAL ENCOUNTER (OUTPATIENT)
Dept: RADIOLOGY | Facility: HOSPITAL | Age: 64
Discharge: HOME | End: 2024-07-16
Payer: COMMERCIAL

## 2024-07-16 VITALS — BODY MASS INDEX: 32.6 KG/M2 | WEIGHT: 184 LBS | HEIGHT: 63 IN

## 2024-07-16 DIAGNOSIS — S82.852D CLOSED TRIMALLEOLAR FRACTURE OF LEFT ANKLE WITH ROUTINE HEALING, SUBSEQUENT ENCOUNTER: ICD-10-CM

## 2024-07-16 DIAGNOSIS — M19.079 ARTHRITIS OF FOOT: Primary | ICD-10-CM

## 2024-07-16 PROCEDURE — 73630 X-RAY EXAM OF FOOT: CPT | Mod: LT

## 2024-07-16 PROCEDURE — 3008F BODY MASS INDEX DOCD: CPT | Performed by: SPECIALIST

## 2024-07-16 PROCEDURE — 99214 OFFICE O/P EST MOD 30 MIN: CPT | Performed by: SPECIALIST

## 2024-07-16 PROCEDURE — 1159F MED LIST DOCD IN RCRD: CPT | Performed by: SPECIALIST

## 2024-07-16 PROCEDURE — 73630 X-RAY EXAM OF FOOT: CPT | Mod: LEFT SIDE | Performed by: RADIOLOGY

## 2024-07-16 NOTE — PROGRESS NOTES
POV left ankle ORIF 12/29/23  XRAYS DONE TODAY   Still having foot pain some in the ankle   Postop Left ankle ORIF 12/29/23 - xrays done today she still has some pain but improved and is fully weightbearing just recently weaning from boot.  She has been working with physical therapy advancing     Exam: Left ankle with decreased swelling intact well-healed incisions.  Good alignment good active motion 5 degrees dorsiflexion 40 degrees plantarflexion.  Mild pain to palpation mostly posterior but stable stress testing of the joint with intact dermis normal neurovascular status and negative Homans.  Pain now noted to the intertarsal region dorsal left midfoot exacerbated by rotational stress    Radiographs: 3 view standing left foot shows some osteoarthritic change in the intertarsal region no other acute findings.   Assessment plan: Well-healed left ankle fracture with symptomatic midfoot osteoarthritis.  She would benefit from pair of custom foot orthotics.  Follow-up if no improvement

## 2024-07-17 DIAGNOSIS — I10 PRIMARY HYPERTENSION: ICD-10-CM

## 2024-07-17 RX ORDER — IRBESARTAN 75 MG/1
75 TABLET ORAL DAILY
Qty: 90 TABLET | Refills: 0 | Status: SHIPPED | OUTPATIENT
Start: 2024-07-17

## 2024-08-04 ENCOUNTER — LAB REQUISITION (OUTPATIENT)
Dept: LAB | Facility: HOSPITAL | Age: 64
End: 2024-08-04
Payer: COMMERCIAL

## 2024-08-04 DIAGNOSIS — M54.59 OTHER LOW BACK PAIN: ICD-10-CM

## 2024-08-04 PROCEDURE — 87086 URINE CULTURE/COLONY COUNT: CPT

## 2024-08-06 LAB — BACTERIA UR CULT: NORMAL

## 2024-08-07 ENCOUNTER — OFFICE VISIT (OUTPATIENT)
Dept: PRIMARY CARE | Facility: CLINIC | Age: 64
End: 2024-08-07
Payer: COMMERCIAL

## 2024-08-07 VITALS
BODY MASS INDEX: 32.43 KG/M2 | DIASTOLIC BLOOD PRESSURE: 60 MMHG | WEIGHT: 183 LBS | HEART RATE: 86 BPM | SYSTOLIC BLOOD PRESSURE: 120 MMHG | HEIGHT: 63 IN

## 2024-08-07 DIAGNOSIS — M99.02 SEGMENTAL AND SOMATIC DYSFUNCTION OF THORACIC REGION: Primary | ICD-10-CM

## 2024-08-07 PROCEDURE — 3074F SYST BP LT 130 MM HG: CPT | Performed by: INTERNAL MEDICINE

## 2024-08-07 PROCEDURE — 3008F BODY MASS INDEX DOCD: CPT | Performed by: INTERNAL MEDICINE

## 2024-08-07 PROCEDURE — 3078F DIAST BP <80 MM HG: CPT | Performed by: INTERNAL MEDICINE

## 2024-08-07 PROCEDURE — 1160F RVW MEDS BY RX/DR IN RCRD: CPT | Performed by: INTERNAL MEDICINE

## 2024-08-07 PROCEDURE — 1159F MED LIST DOCD IN RCRD: CPT | Performed by: INTERNAL MEDICINE

## 2024-08-07 PROCEDURE — 1123F ACP DISCUSS/DSCN MKR DOCD: CPT | Performed by: INTERNAL MEDICINE

## 2024-08-07 PROCEDURE — 98925 OSTEOPATH MANJ 1-2 REGIONS: CPT | Performed by: INTERNAL MEDICINE

## 2024-08-07 PROCEDURE — 1036F TOBACCO NON-USER: CPT | Performed by: INTERNAL MEDICINE

## 2024-08-07 PROCEDURE — 1158F ADVNC CARE PLAN TLK DOCD: CPT | Performed by: INTERNAL MEDICINE

## 2024-08-07 PROCEDURE — 99213 OFFICE O/P EST LOW 20 MIN: CPT | Performed by: INTERNAL MEDICINE

## 2024-08-07 RX ORDER — VALACYCLOVIR HYDROCHLORIDE 1 G/1
TABLET, FILM COATED ORAL
COMMUNITY
Start: 2024-08-04

## 2024-08-07 RX ORDER — FLUTICASONE PROPIONATE AND SALMETEROL 250; 50 UG/1; UG/1
1 POWDER RESPIRATORY (INHALATION) 2 TIMES DAILY
COMMUNITY
Start: 2024-06-22

## 2024-08-07 RX ORDER — CYCLOBENZAPRINE HCL 5 MG
5 TABLET ORAL 3 TIMES DAILY PRN
Qty: 30 TABLET | Refills: 2 | Status: SHIPPED | OUTPATIENT
Start: 2024-08-07 | End: 2025-04-04

## 2024-08-07 RX ORDER — TRAMADOL HYDROCHLORIDE 50 MG/1
50 TABLET ORAL EVERY 8 HOURS PRN
Qty: 10 TABLET | Refills: 0 | Status: SHIPPED | OUTPATIENT
Start: 2024-08-07 | End: 2024-08-17

## 2024-08-07 ASSESSMENT — ENCOUNTER SYMPTOMS
BOWEL INCONTINENCE: 0
FEVER: 0
WEAKNESS: 0
PARESTHESIAS: 0
TINGLING: 0
NUMBNESS: 0
DYSURIA: 0
PARESIS: 0
WEIGHT LOSS: 0
ABDOMINAL PAIN: 0
LEG PAIN: 0
BACK PAIN: 1
HEADACHES: 0
PERIANAL NUMBNESS: 0

## 2024-08-07 NOTE — ASSESSMENT & PLAN NOTE
Given treatment of thoracic T7-T9 dysfunction with rib dysfunction using techniques of myofascial release strain counterstrain HVLA and thoracic rib muscle energy patient tolerated well in office will prescribe cyclobenzaprine 5 mg 3 times daily as needed for spasm and limited dose of tramadol for severe pain #10 no refills okay for Motrin 600 mg for acute pain reevaluate with regular scheduled appointment

## 2024-08-07 NOTE — PROGRESS NOTES
"Subjective   Reason for Visit: Amarilis Burger is an 64 y.o. female here for a Medicare Wellness visit.     Past Medical, Surgical, and Family History reviewed and updated in chart.    Reviewed all medications by prescribing practitioner or clinical pharmacist (such as prescriptions, OTCs, herbal therapies and supplements) and documented in the medical record.    Back Pain  This is a new problem. The current episode started in the past 7 days. The problem occurs constantly. The problem has been gradually improving since onset. The pain is present in the thoracic spine. The quality of the pain is described as aching, burning and stabbing. The pain does not radiate. The pain is at a severity of 8/10. The symptoms are aggravated by lying down and twisting. Stiffness is present In the morning. Pertinent negatives include no abdominal pain, bladder incontinence, bowel incontinence, chest pain, dysuria, fever, headaches, leg pain, numbness, paresis, paresthesias, pelvic pain, perianal numbness, tingling, weakness or weight loss. Risk factors include menopause and obesity.       Patient Care Team:  Reinaldo Nettles DO as PCP - General     Review of Systems   Constitutional:  Negative for fever and weight loss.   Cardiovascular:  Negative for chest pain.   Gastrointestinal:  Negative for abdominal pain and bowel incontinence.   Genitourinary:  Negative for bladder incontinence, dysuria and pelvic pain.   Musculoskeletal:  Positive for back pain.   Neurological:  Negative for tingling, weakness, numbness, headaches and paresthesias.   All other systems reviewed and are negative.      Objective   Vitals:  /60   Pulse 86   Ht 1.6 m (5' 3\")   Wt 83 kg (183 lb)   LMP  (LMP Unknown)   BMI 32.42 kg/m²       Physical Exam    Assessment/Plan   Problem List Items Addressed This Visit             ICD-10-CM    Segmental and somatic dysfunction of thoracic region - Primary M99.02     Given treatment of thoracic T7-T9 " Speech Treatment Note    Today's date: 10/18/2018  Patient name: Bryan Posada  : 2016  MRN: [id number]  Referring provider: Alexis Rose MD  Dx:   Encounter Diagnosis     ICD-10-CM    1  Other symbolic dysfunctions G53 8    2  Autism spectrum disorder F84 0        Start Time: 1400  Stop Time: 97  Total time in clinic (min): 45 minutes    Visit Number: Primary exhausted; Secondary     Subjective/Behavioral: Pt easily transitioned to ST session after OT session  Slightly tired upon entering session  He participated well throughout all activities  Continued targeting use of appropriate eye contact with communication partner today  With verbal reminders, he demonstrated more appropriate eye contact  Goal 1: Siddhartha Rodrigez will use appropriate personal pronouns during structured and unstructured activities on 8/10 opp x3 sessions  -   Appropriate use of personal pronouns on ~75% of opp in spontaneous connected speech  Intermittently substituted he/I (e g , "He's going through the tunnel" when pt was going through the tunnel)  Able to correct with verbal cues  Goal 2: Siddhartha Rodrigez will initiate a communication interaction during play-based activities a minimum of 3x per session  -  Continued to target asking questions vs  making commands/demands  When reminded to ask a question and given choices of question words (e g , "who, what, can"), he was able to ask "Can I   ?" questions 6x  Goal 3: Siddhartha Rodrigez will correctly answer functional WHAT and WHERE questions during structured and unstructured questions on 8/10 opp x3 sessions  -   Targeted answering inferential why/because questions with pictures (e g , "Why are they brushing their teeth?" pt - "Because they're getting ready for bed ")  Correct on  opp independently, increasing to 7/ with semantic cues and verbal cues  He answered WHERE questions on 8/10 opp independently   When given two reminders to answer with a place, he increased to 10/10  Other:Discussed session and patient progress with caregiver/family member after today's session      Recommendations:Continue with Plan of Care dysfunction with rib dysfunction using techniques of myofascial release strain counterstrain HVLA and thoracic rib muscle energy patient tolerated well in office will prescribe cyclobenzaprine 5 mg 3 times daily as needed for spasm and limited dose of tramadol for severe pain #10 no refills okay for Motrin 600 mg for acute pain reevaluate with regular scheduled appointment         Relevant Medications    cyclobenzaprine (Flexeril) 5 mg tablet    traMADol (Ultram) 50 mg tablet

## 2024-08-07 NOTE — PROGRESS NOTES
"Subjective   Patient ID: Amarilis Burger is a 64 y.o. female who presents for urgent care followup singles? (Took antiviral but stopped it says it is not shingles. Says it muscle related. States she vacuumed on Friday and pain started on Saturday. Still has 2 days left of the steroid ).    HPI     Review of Systems    Objective   /60   Pulse 86   Ht 1.6 m (5' 3\")   Wt 83 kg (183 lb)   LMP  (LMP Unknown)   BMI 32.42 kg/m²     Physical Exam    Assessment/Plan          "

## 2024-09-10 DIAGNOSIS — M99.02 SEGMENTAL AND SOMATIC DYSFUNCTION OF THORACIC REGION: ICD-10-CM

## 2024-09-10 RX ORDER — CYCLOBENZAPRINE HCL 5 MG
TABLET ORAL
Qty: 30 TABLET | Refills: 0 | Status: SHIPPED | OUTPATIENT
Start: 2024-09-10

## 2024-09-11 ENCOUNTER — APPOINTMENT (OUTPATIENT)
Dept: PRIMARY CARE | Facility: CLINIC | Age: 64
End: 2024-09-11
Payer: COMMERCIAL

## 2024-09-20 DIAGNOSIS — M99.02 SEGMENTAL AND SOMATIC DYSFUNCTION OF THORACIC REGION: ICD-10-CM

## 2024-09-20 RX ORDER — CYCLOBENZAPRINE HCL 5 MG
5 TABLET ORAL 3 TIMES DAILY PRN
Qty: 30 TABLET | Refills: 0 | Status: SHIPPED | OUTPATIENT
Start: 2024-09-20

## 2024-10-08 ENCOUNTER — LAB (OUTPATIENT)
Dept: LAB | Facility: LAB | Age: 64
End: 2024-10-08
Payer: COMMERCIAL

## 2024-10-08 DIAGNOSIS — I10 PRIMARY HYPERTENSION: ICD-10-CM

## 2024-10-08 DIAGNOSIS — J45.40 MODERATE PERSISTENT ASTHMA WITHOUT COMPLICATION (HHS-HCC): ICD-10-CM

## 2024-10-08 DIAGNOSIS — E66.811 CLASS 1 OBESITY DUE TO EXCESS CALORIES WITH SERIOUS COMORBIDITY AND BODY MASS INDEX (BMI) OF 31.0 TO 31.9 IN ADULT: ICD-10-CM

## 2024-10-08 DIAGNOSIS — E66.811 CLASS 1 OBESITY DUE TO EXCESS CALORIES WITH SERIOUS COMORBIDITY AND BODY MASS INDEX (BMI) OF 32.0 TO 32.9 IN ADULT: ICD-10-CM

## 2024-10-08 DIAGNOSIS — K21.9 GASTROESOPHAGEAL REFLUX DISEASE WITHOUT ESOPHAGITIS: ICD-10-CM

## 2024-10-08 DIAGNOSIS — E66.09 CLASS 1 OBESITY DUE TO EXCESS CALORIES WITH SERIOUS COMORBIDITY AND BODY MASS INDEX (BMI) OF 31.0 TO 31.9 IN ADULT: ICD-10-CM

## 2024-10-08 DIAGNOSIS — E66.09 CLASS 1 OBESITY DUE TO EXCESS CALORIES WITH SERIOUS COMORBIDITY AND BODY MASS INDEX (BMI) OF 32.0 TO 32.9 IN ADULT: ICD-10-CM

## 2024-10-08 LAB
ALBUMIN SERPL BCP-MCNC: 4.2 G/DL (ref 3.4–5)
ALP SERPL-CCNC: 45 U/L (ref 33–136)
ALT SERPL W P-5'-P-CCNC: 19 U/L (ref 7–45)
ANION GAP SERPL CALC-SCNC: 9 MMOL/L (ref 10–20)
AST SERPL W P-5'-P-CCNC: 16 U/L (ref 9–39)
BILIRUB SERPL-MCNC: 0.6 MG/DL (ref 0–1.2)
BUN SERPL-MCNC: 13 MG/DL (ref 6–23)
CALCIUM SERPL-MCNC: 9.5 MG/DL (ref 8.6–10.3)
CHLORIDE SERPL-SCNC: 107 MMOL/L (ref 98–107)
CHOLEST SERPL-MCNC: 164 MG/DL (ref 0–199)
CHOLESTEROL/HDL RATIO: 2.8
CO2 SERPL-SCNC: 30 MMOL/L (ref 21–32)
CREAT SERPL-MCNC: 0.68 MG/DL (ref 0.5–1.05)
EGFRCR SERPLBLD CKD-EPI 2021: >90 ML/MIN/1.73M*2
GLUCOSE SERPL-MCNC: 94 MG/DL (ref 74–99)
HDLC SERPL-MCNC: 58.5 MG/DL
LDLC SERPL CALC-MCNC: 83 MG/DL
NON HDL CHOLESTEROL: 106 MG/DL (ref 0–149)
POTASSIUM SERPL-SCNC: 4.2 MMOL/L (ref 3.5–5.3)
PROT SERPL-MCNC: 6.2 G/DL (ref 6.4–8.2)
SODIUM SERPL-SCNC: 142 MMOL/L (ref 136–145)
TRIGL SERPL-MCNC: 115 MG/DL (ref 0–149)
VLDL: 23 MG/DL (ref 0–40)

## 2024-10-08 PROCEDURE — 80053 COMPREHEN METABOLIC PANEL: CPT

## 2024-10-08 PROCEDURE — 80061 LIPID PANEL: CPT

## 2024-10-08 PROCEDURE — 36415 COLL VENOUS BLD VENIPUNCTURE: CPT

## 2024-10-09 ENCOUNTER — APPOINTMENT (OUTPATIENT)
Dept: PRIMARY CARE | Facility: CLINIC | Age: 64
End: 2024-10-09
Payer: COMMERCIAL

## 2024-10-09 VITALS
HEART RATE: 76 BPM | DIASTOLIC BLOOD PRESSURE: 70 MMHG | BODY MASS INDEX: 33.52 KG/M2 | OXYGEN SATURATION: 97 % | SYSTOLIC BLOOD PRESSURE: 100 MMHG | HEIGHT: 63 IN | WEIGHT: 189.2 LBS

## 2024-10-09 DIAGNOSIS — I10 PRIMARY HYPERTENSION: ICD-10-CM

## 2024-10-09 DIAGNOSIS — E66.09 CLASS 1 OBESITY DUE TO EXCESS CALORIES WITH SERIOUS COMORBIDITY AND BODY MASS INDEX (BMI) OF 33.0 TO 33.9 IN ADULT: Primary | ICD-10-CM

## 2024-10-09 DIAGNOSIS — H81.13 BPV (BENIGN POSITIONAL VERTIGO), BILATERAL: ICD-10-CM

## 2024-10-09 DIAGNOSIS — J45.40 MODERATE PERSISTENT ASTHMA WITHOUT COMPLICATION (HHS-HCC): ICD-10-CM

## 2024-10-09 DIAGNOSIS — K21.9 GASTROESOPHAGEAL REFLUX DISEASE WITHOUT ESOPHAGITIS: ICD-10-CM

## 2024-10-09 DIAGNOSIS — M99.02 SEGMENTAL AND SOMATIC DYSFUNCTION OF THORACIC REGION: ICD-10-CM

## 2024-10-09 DIAGNOSIS — E78.2 MIXED HYPERLIPIDEMIA: ICD-10-CM

## 2024-10-09 DIAGNOSIS — E66.811 CLASS 1 OBESITY DUE TO EXCESS CALORIES WITH SERIOUS COMORBIDITY AND BODY MASS INDEX (BMI) OF 33.0 TO 33.9 IN ADULT: Primary | ICD-10-CM

## 2024-10-09 DIAGNOSIS — Z00.00 HEALTHCARE MAINTENANCE: ICD-10-CM

## 2024-10-09 PROBLEM — N95.0 POSTMENOPAUSAL BLEEDING: Status: RESOLVED | Noted: 2023-04-12 | Resolved: 2024-10-09

## 2024-10-09 PROCEDURE — 99213 OFFICE O/P EST LOW 20 MIN: CPT | Performed by: INTERNAL MEDICINE

## 2024-10-09 PROCEDURE — 1160F RVW MEDS BY RX/DR IN RCRD: CPT | Performed by: INTERNAL MEDICINE

## 2024-10-09 PROCEDURE — 90471 IMMUNIZATION ADMIN: CPT | Performed by: INTERNAL MEDICINE

## 2024-10-09 PROCEDURE — 1159F MED LIST DOCD IN RCRD: CPT | Performed by: INTERNAL MEDICINE

## 2024-10-09 PROCEDURE — 3074F SYST BP LT 130 MM HG: CPT | Performed by: INTERNAL MEDICINE

## 2024-10-09 PROCEDURE — 1036F TOBACCO NON-USER: CPT | Performed by: INTERNAL MEDICINE

## 2024-10-09 PROCEDURE — 3008F BODY MASS INDEX DOCD: CPT | Performed by: INTERNAL MEDICINE

## 2024-10-09 PROCEDURE — 3078F DIAST BP <80 MM HG: CPT | Performed by: INTERNAL MEDICINE

## 2024-10-09 PROCEDURE — 90662 IIV NO PRSV INCREASED AG IM: CPT | Performed by: INTERNAL MEDICINE

## 2024-10-09 RX ORDER — IRBESARTAN 75 MG/1
75 TABLET ORAL DAILY
Qty: 90 TABLET | Refills: 3 | Status: SHIPPED | OUTPATIENT
Start: 2024-10-09 | End: 2025-10-09

## 2024-10-09 RX ORDER — IRBESARTAN 75 MG/1
75 TABLET ORAL DAILY
Qty: 90 TABLET | Refills: 0 | Status: CANCELLED | OUTPATIENT
Start: 2024-10-09

## 2024-10-09 RX ORDER — CYCLOBENZAPRINE HCL 5 MG
5 TABLET ORAL 2 TIMES DAILY
Qty: 180 TABLET | Refills: 3 | Status: SHIPPED | OUTPATIENT
Start: 2024-10-09 | End: 2025-10-09

## 2024-10-09 ASSESSMENT — ENCOUNTER SYMPTOMS
OCCASIONAL FEELINGS OF UNSTEADINESS: 0
DEPRESSION: 0
LOSS OF SENSATION IN FEET: 0

## 2024-10-09 ASSESSMENT — PATIENT HEALTH QUESTIONNAIRE - PHQ9
1. LITTLE INTEREST OR PLEASURE IN DOING THINGS: NOT AT ALL
SUM OF ALL RESPONSES TO PHQ9 QUESTIONS 1 AND 2: 0
2. FEELING DOWN, DEPRESSED OR HOPELESS: NOT AT ALL

## 2024-10-09 NOTE — ASSESSMENT & PLAN NOTE
Bp stable cont irbesartan 75mg one daily refilled  Orders:    Follow Up In [facility] - PCP - Established    irbesartan (Avapro) 75 mg tablet; Take 1 tablet (75 mg) by mouth once daily.    Follow Up In [facility] - PCP - Established; Future

## 2024-10-09 NOTE — ASSESSMENT & PLAN NOTE
Resolved consider pt/ot eval refill bid low dose cyclobenzaprine   Orders:    cyclobenzaprine (Flexeril) 5 mg tablet; Take 1 tablet (5 mg) by mouth 2 times a day.

## 2024-10-09 NOTE — PROGRESS NOTES
"Subjective   Patient ID: Amarilis Burger is a 64 y.o. female who presents for Follow-up.    HPI     Review of Systems    Objective   /70   Pulse 76   Ht 1.6 m (5' 3\")   Wt 85.8 kg (189 lb 3.2 oz)   LMP  (LMP Unknown)   SpO2 97%   BMI 33.52 kg/m²     Physical Exam    Assessment/Plan          "

## 2024-10-09 NOTE — PATIENT INSTRUCTIONS

## 2024-10-09 NOTE — PROGRESS NOTES
"Subjective   Reason for Visit: Amarilis Burger is an 64 y.o. female here for a Medicare Wellness visit.     Past Medical, Surgical, and Family History reviewed and updated in chart.         HPI    Patient Care Team:  Reinaldo Nettles DO as PCP - General     Review of Systems   All other systems reviewed and are negative.      Objective   Vitals:  /70   Pulse 76   Ht 1.6 m (5' 3\")   Wt 85.8 kg (189 lb 3.2 oz)   LMP  (LMP Unknown)   SpO2 97%   BMI 33.52 kg/m²       Physical Exam  Vitals and nursing note reviewed.   Constitutional:       General: She is not in acute distress.     Appearance: Normal appearance. She is well-developed. She is obese. She is not toxic-appearing.   HENT:      Head: Normocephalic and atraumatic.      Right Ear: Tympanic membrane and external ear normal.      Left Ear: Tympanic membrane and external ear normal.      Nose: Nose normal.      Mouth/Throat:      Mouth: Mucous membranes are moist.      Pharynx: Oropharynx is clear. No oropharyngeal exudate or posterior oropharyngeal erythema.      Tonsils: No tonsillar exudate. 2+ on the right. 2+ on the left.   Eyes:      Extraocular Movements: Extraocular movements intact.      Conjunctiva/sclera: Conjunctivae normal.   Cardiovascular:      Rate and Rhythm: Normal rate and regular rhythm.      Pulses: Normal pulses.      Heart sounds: Normal heart sounds. No murmur heard.  Pulmonary:      Effort: Pulmonary effort is normal.      Breath sounds: Normal breath sounds.   Abdominal:      General: Abdomen is flat. Bowel sounds are normal.      Palpations: Abdomen is soft.   Musculoskeletal:      Cervical back: Neck supple.   Lymphadenopathy:      Cervical: No cervical adenopathy.   Skin:     General: Skin is warm and dry.      Findings: No rash.   Neurological:      Mental Status: She is alert. Mental status is at baseline.   Psychiatric:         Mood and Affect: Mood normal.         Behavior: Behavior normal.         Thought Content: " Thought content normal.         Judgment: Judgment normal.     Lifestyle Recommendations  I recommend a whole-food plant-based diet, an eating pattern that encourages the consumption of unrefined plant foods (such as fruits, vegetables, tubers, whole grains, legumes, nuts and seeds) and discourages meats, dairy products, eggs and processed foods.     The AHA/ACC recommends that the patient consume a dietary pattern that emphasizes intake of vegetables, fruits, and whole grains; includes low-fat dairy products, poultry, fish, legumes, non-tropical vegetable oils, and nuts; and limits intake of sodium, sweets, sugar-sweetened beverages, and red meats.  Adapt this dietary pattern to appropriate calorie requirements (a 500-750 kcal/day deficit to loose weight), personal and cultural food preferences, and nutrition therapy for other medical conditions (including diabetes).  Achieve this pattern by following plans such as the [place] Mediterranean, DASH dietary pattern, or AHA diet.     Engage in 2 hours and 30 minutes per week of moderate-intensity physical activity, or 1 hour and 15 minutes (75 minutes) per week of vigorous-intensity aerobic physical activity, or an equivalent combination of moderate and vigorous-intensity aerobic physical activity. Aerobic activity should be performed in episodes of at least 10 minutes preferably spread throughout the week.     Adhering to a heart healthy diet, regular exercise habits, avoidance of tobacco products, and maintenance of a healthy weight are crucial components of their heart disease risk reduction.    Assessment & Plan  Moderate persistent asthma without complication (HHS-HCC)  Stable continue on advair discuss asthma control 25 prn albuterol  Orders:    Follow [facility] - PCP - Established    Primary hypertension  Bp stable cont irbesartan 75mg one daily refilled  Orders:    [facility] - PCP - Established    irbesartan (Avapro) 75  mg tablet; Take 1 tablet (75 mg) by mouth once daily.    Follow [facility] - PCP - Established; Future    Gastroesophageal reflux disease without esophagitis  Cont pantoprazole 40mg daily and nightly famotidine 40mg one daily   Orders:    Follow [facility] - PCP - Established    Healthcare maintenance    Orders:    Flu vaccine, trivalent, preservative free, HIGH-DOSE, age 65y+ (Fluzone)    Class 1 obesity due to excess calories with serious comorbidity and body mass index (BMI) of 33.0 to 33.9 in adult          BPV (benign positional vertigo), bilateral  Continue and given positional exercises        Mixed hyperlipidemia  Cont atorvostatin 10mg daily reeval next visit       Segmental and somatic dysfunction of thoracic region  Resolved consider pt/ot eval refill bid low dose cyclobenzaprine   Orders:    cyclobenzaprine (Flexeril) 5 mg tablet; Take 1 tablet (5 mg) by mouth 2 times a day.            Patient was identified as a fall risk. Risk prevention instructions provided.

## 2024-10-09 NOTE — ASSESSMENT & PLAN NOTE
Stable continue on advair discuss asthma control 25 prn albuterol  Orders:    Follow Up [facility] - PCP - Established

## 2024-10-09 NOTE — ASSESSMENT & PLAN NOTE
Cont pantoprazole 40mg daily and nightly famotidine 40mg one daily   Orders:    Follow [facility] - PCP - Established

## 2024-10-13 PROBLEM — Z00.00 HEALTHCARE MAINTENANCE: Status: ACTIVE | Noted: 2024-10-13

## 2024-11-05 ENCOUNTER — OFFICE VISIT (OUTPATIENT)
Dept: ORTHOPEDIC SURGERY | Facility: HOSPITAL | Age: 64
End: 2024-11-05
Payer: COMMERCIAL

## 2024-11-05 VITALS — HEIGHT: 63 IN | WEIGHT: 189 LBS | BODY MASS INDEX: 33.49 KG/M2

## 2024-11-05 DIAGNOSIS — R42 VERTIGO: ICD-10-CM

## 2024-11-05 DIAGNOSIS — M19.072 OSTEOARTHRITIS OF LEFT FOOT, UNSPECIFIED OSTEOARTHRITIS TYPE: Primary | ICD-10-CM

## 2024-11-05 DIAGNOSIS — H81.13 BPV (BENIGN POSITIONAL VERTIGO), BILATERAL: Primary | ICD-10-CM

## 2024-11-05 PROCEDURE — 99213 OFFICE O/P EST LOW 20 MIN: CPT | Performed by: SPECIALIST

## 2024-11-05 RX ORDER — MECLIZINE HYDROCHLORIDE 25 MG/1
25 TABLET ORAL 2 TIMES DAILY PRN
Qty: 180 TABLET | Refills: 0 | Status: SHIPPED | OUTPATIENT
Start: 2024-11-05 | End: 2025-11-05

## 2024-11-05 NOTE — PROGRESS NOTES
F/U Left ankle/foot pain   Patient received her orthotics on 9/11 has seen some improvement.  She is just over 10 months out from ORIF ankle and is slowly improving.    Exam: Has pain to palpation of the first and second TMT dorsal midfoot region.  In that area is painful with rotational stress through Lisfranc's.  Rest exam is unremarkable with minimal swelling of the ankle with good passive motion.  Normal neurovascular.    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  She is currently improved relative to her overall foot and ankle and will continue with the orthotic.  Follow-up as needed.

## 2024-11-18 ENCOUNTER — TELEPHONE (OUTPATIENT)
Dept: PRIMARY CARE | Facility: CLINIC | Age: 64
End: 2024-11-18
Payer: COMMERCIAL

## 2024-11-18 DIAGNOSIS — H81.13 BPV (BENIGN POSITIONAL VERTIGO), BILATERAL: ICD-10-CM

## 2024-11-18 DIAGNOSIS — H93.13 BILATERAL TINNITUS: Primary | ICD-10-CM

## 2024-11-18 NOTE — TELEPHONE ENCOUNTER
She called cause she is still fighting the vertigo and her ears are full please advise she wants to come in to see you and call her at [phone]

## 2024-11-18 NOTE — PATIENT INSTRUCTIONS

## 2024-11-25 ENCOUNTER — APPOINTMENT (OUTPATIENT)
Dept: OBSTETRICS AND GYNECOLOGY | Facility: CLINIC | Age: 64
End: 2024-11-25
Payer: COMMERCIAL

## 2024-12-05 ENCOUNTER — APPOINTMENT (OUTPATIENT)
Dept: OBSTETRICS AND GYNECOLOGY | Facility: CLINIC | Age: 64
End: 2024-12-05
Payer: COMMERCIAL

## 2024-12-05 VITALS — SYSTOLIC BLOOD PRESSURE: 120 MMHG | WEIGHT: 188 LBS | BODY MASS INDEX: 33.3 KG/M2 | DIASTOLIC BLOOD PRESSURE: 70 MMHG

## 2024-12-05 DIAGNOSIS — M25.50 POLYARTHRALGIA: ICD-10-CM

## 2024-12-05 DIAGNOSIS — E66.09 CLASS 1 OBESITY DUE TO EXCESS CALORIES WITH SERIOUS COMORBIDITY AND BODY MASS INDEX (BMI) OF 31.0 TO 31.9 IN ADULT: ICD-10-CM

## 2024-12-05 DIAGNOSIS — R42 VERTIGO: ICD-10-CM

## 2024-12-05 DIAGNOSIS — Z01.419 ENCOUNTER FOR WELL WOMAN EXAM WITH ROUTINE GYNECOLOGICAL EXAM: Primary | ICD-10-CM

## 2024-12-05 DIAGNOSIS — E66.811 CLASS 1 OBESITY DUE TO EXCESS CALORIES WITH SERIOUS COMORBIDITY AND BODY MASS INDEX (BMI) OF 31.0 TO 31.9 IN ADULT: ICD-10-CM

## 2024-12-05 DIAGNOSIS — Z23 FLU VACCINE NEED: ICD-10-CM

## 2024-12-05 DIAGNOSIS — K21.9 GASTROESOPHAGEAL REFLUX DISEASE WITHOUT ESOPHAGITIS: ICD-10-CM

## 2024-12-05 DIAGNOSIS — I10 PRIMARY HYPERTENSION: ICD-10-CM

## 2024-12-05 DIAGNOSIS — J45.40 MODERATE PERSISTENT ASTHMA WITHOUT COMPLICATION (HHS-HCC): ICD-10-CM

## 2024-12-05 PROCEDURE — 3078F DIAST BP <80 MM HG: CPT | Performed by: NURSE PRACTITIONER

## 2024-12-05 PROCEDURE — 1159F MED LIST DOCD IN RCRD: CPT | Performed by: NURSE PRACTITIONER

## 2024-12-05 PROCEDURE — 3074F SYST BP LT 130 MM HG: CPT | Performed by: NURSE PRACTITIONER

## 2024-12-05 PROCEDURE — 99397 PER PM REEVAL EST PAT 65+ YR: CPT | Performed by: NURSE PRACTITIONER

## 2024-12-05 PROCEDURE — 1036F TOBACCO NON-USER: CPT | Performed by: NURSE PRACTITIONER

## 2024-12-05 NOTE — PROGRESS NOTES
HPI:   Amarilis Burger is a 64 y.o. who presents today for her annual gynecologic exam without complaints    She has the following concerns; None. She is doing well. Continues to have some hot flashes; overall coping well.     GYN HISTORY:  Denies any PMB     PAP History   Last pap:   2023 Normal HPV Negative  History of abnormal pap: no  HPV vaccine: no  @paphx@    Health Screening  Family history of breast, uterine, ovarian or colon cancer:pot is adopted; family hx is unknown.   Breast cancer: mammogram - needs an order.   Last mammogram: 2023    Colon cancer: done in 2020; next due in 2025.       The patient feels safe at home.         Review of Systems:   Constitutional: no fever and no chills.  Cardiovascular: no chest pain.   Respiratory: no shortness of breath.   Gastrointestinal: no nausea, no abdominal pain and no constipation  Genitourinary: no dysuria, no urinary incontinence, no vaginal dryness, no pelvic pain and no vaginal discharge.   Neurological: no headache.  Psychiatric: no anxiety and no depression.              Objective         /70   Wt 85.3 kg (188 lb)   LMP  (LMP Unknown)   BMI 33.30 kg/m²         Physical Exam:   Constitutional: Alert and in no acute distress. Well developed, well nourished.      Neck: No neck asymmetry. Supple. Thyroid not enlarged and there were no palpable thyroid nodules.      Cardiovascular: Heart rate and rhythm were normal, normal S1 and S2, no gallops, and no murmurs.      Pulmonary: No respiratory distress. Clear bilateral breath sounds.      Chest: Breasts: Normal appearance, no nipple discharge and no skin changes. Palpation of breasts and axillae: No palpable mass and no axillary lymphadenopathy.      Abdomen: Soft nontender; no abdominal mass palpated. Normal bowel sounds. No organomegaly.      Genitourinary:   - External genitalia: Normal.   - Palpation of lymph nodes in groin: No inguinal lymphadenopathy.   - Bartholin's Urethral and Skenes  Glands: Normal.   - Urethra: Normal.    -Bladder: Normal on palpation.   - Vagina: Normal.   - Cervix: Normal.   - Uterus: Normal. Right Adnexa/parametria: Normal. Left Adnexa/parametria: Normal.   - Perianal Area: Normal.      Skin: Normal skin color and pigmentation, normal skin turgor, and no rash     Psychiatric: Alert and oriented x 3. Affect normal to patient baseline. Mood: Appropriate.            Assessment/Plan       Diagnoses and all orders for this visit:  Encounter for well woman exam with routine gynecological exam  Here for well woman exam. She is doing well. Coping with menopausal symptoms well. Had a recent ankle fracture with subsequent bone density scan which showed osteopenia. PAP in 2-23; NILM and negative HPV. Colonoscopy due in 2025. Mammogram ordered. Due this month.   Screen for breast cancer  Screening mammogram ordered.   Follow-up annually; sooner if needed.        Rosalba Beckman, GRACE-CNP

## 2024-12-23 ENCOUNTER — HOSPITAL ENCOUNTER (OUTPATIENT)
Dept: RADIOLOGY | Facility: HOSPITAL | Age: 64
Discharge: HOME | End: 2024-12-23
Payer: COMMERCIAL

## 2024-12-23 VITALS — HEIGHT: 64 IN | WEIGHT: 180 LBS | BODY MASS INDEX: 30.73 KG/M2

## 2024-12-23 DIAGNOSIS — I10 PRIMARY HYPERTENSION: ICD-10-CM

## 2024-12-23 DIAGNOSIS — K21.9 GASTROESOPHAGEAL REFLUX DISEASE WITHOUT ESOPHAGITIS: ICD-10-CM

## 2024-12-23 DIAGNOSIS — E66.09 CLASS 1 OBESITY DUE TO EXCESS CALORIES WITH SERIOUS COMORBIDITY AND BODY MASS INDEX (BMI) OF 31.0 TO 31.9 IN ADULT: ICD-10-CM

## 2024-12-23 DIAGNOSIS — R42 VERTIGO: ICD-10-CM

## 2024-12-23 DIAGNOSIS — J45.40 MODERATE PERSISTENT ASTHMA WITHOUT COMPLICATION (HHS-HCC): ICD-10-CM

## 2024-12-23 DIAGNOSIS — E66.811 CLASS 1 OBESITY DUE TO EXCESS CALORIES WITH SERIOUS COMORBIDITY AND BODY MASS INDEX (BMI) OF 31.0 TO 31.9 IN ADULT: ICD-10-CM

## 2024-12-23 DIAGNOSIS — Z23 FLU VACCINE NEED: ICD-10-CM

## 2024-12-23 DIAGNOSIS — M25.50 POLYARTHRALGIA: ICD-10-CM

## 2024-12-23 PROCEDURE — 77067 SCR MAMMO BI INCL CAD: CPT

## 2024-12-23 PROCEDURE — 77063 BREAST TOMOSYNTHESIS BI: CPT

## 2025-02-13 DIAGNOSIS — K21.9 GASTROESOPHAGEAL REFLUX DISEASE WITHOUT ESOPHAGITIS: ICD-10-CM

## 2025-02-13 DIAGNOSIS — E78.2 MIXED HYPERLIPIDEMIA: ICD-10-CM

## 2025-02-13 RX ORDER — PANTOPRAZOLE SODIUM 40 MG/1
40 TABLET, DELAYED RELEASE ORAL DAILY
Qty: 90 TABLET | Refills: 3 | Status: SHIPPED | OUTPATIENT
Start: 2025-02-13

## 2025-02-13 RX ORDER — ATORVASTATIN CALCIUM 10 MG/1
10 TABLET, FILM COATED ORAL DAILY
Qty: 90 TABLET | Refills: 3 | Status: SHIPPED | OUTPATIENT
Start: 2025-02-13

## 2025-02-13 NOTE — TELEPHONE ENCOUNTER
Good morning Dr. Nettles's Team     I need the following prescriptions refilled:     Pantoprazole  Atorvastatin     [facility] in [place]  I have about 4 days left  Thank you and have a great day!  Lexi Burger

## 2025-04-09 ENCOUNTER — APPOINTMENT (OUTPATIENT)
Dept: PRIMARY CARE | Facility: CLINIC | Age: 65
End: 2025-04-09
Payer: COMMERCIAL

## 2025-04-09 VITALS
WEIGHT: 185 LBS | DIASTOLIC BLOOD PRESSURE: 70 MMHG | HEART RATE: 77 BPM | SYSTOLIC BLOOD PRESSURE: 108 MMHG | HEIGHT: 64 IN | BODY MASS INDEX: 31.58 KG/M2

## 2025-04-09 DIAGNOSIS — I10 PRIMARY HYPERTENSION: ICD-10-CM

## 2025-04-09 DIAGNOSIS — E66.09 CLASS 1 OBESITY DUE TO EXCESS CALORIES WITH SERIOUS COMORBIDITY AND BODY MASS INDEX (BMI) OF 31.0 TO 31.9 IN ADULT: ICD-10-CM

## 2025-04-09 DIAGNOSIS — E66.811 CLASS 1 OBESITY DUE TO EXCESS CALORIES WITH SERIOUS COMORBIDITY AND BODY MASS INDEX (BMI) OF 31.0 TO 31.9 IN ADULT: ICD-10-CM

## 2025-04-09 DIAGNOSIS — K21.9 GASTROESOPHAGEAL REFLUX DISEASE WITHOUT ESOPHAGITIS: ICD-10-CM

## 2025-04-09 DIAGNOSIS — R73.02 IGT (IMPAIRED GLUCOSE TOLERANCE): ICD-10-CM

## 2025-04-09 DIAGNOSIS — E78.2 MIXED HYPERLIPIDEMIA: ICD-10-CM

## 2025-04-09 DIAGNOSIS — J45.40 MODERATE PERSISTENT ASTHMA WITHOUT COMPLICATION (HHS-HCC): Primary | ICD-10-CM

## 2025-04-09 PROBLEM — Z00.00 HEALTHCARE MAINTENANCE: Status: RESOLVED | Noted: 2024-10-13 | Resolved: 2025-04-09

## 2025-04-09 PROBLEM — M99.02 SEGMENTAL AND SOMATIC DYSFUNCTION OF THORACIC REGION: Status: RESOLVED | Noted: 2024-08-07 | Resolved: 2025-04-09

## 2025-04-09 PROCEDURE — 3008F BODY MASS INDEX DOCD: CPT | Performed by: INTERNAL MEDICINE

## 2025-04-09 PROCEDURE — 99213 OFFICE O/P EST LOW 20 MIN: CPT | Performed by: INTERNAL MEDICINE

## 2025-04-09 PROCEDURE — 3074F SYST BP LT 130 MM HG: CPT | Performed by: INTERNAL MEDICINE

## 2025-04-09 PROCEDURE — 3078F DIAST BP <80 MM HG: CPT | Performed by: INTERNAL MEDICINE

## 2025-04-09 PROCEDURE — 1160F RVW MEDS BY RX/DR IN RCRD: CPT | Performed by: INTERNAL MEDICINE

## 2025-04-09 PROCEDURE — 1036F TOBACCO NON-USER: CPT | Performed by: INTERNAL MEDICINE

## 2025-04-09 PROCEDURE — 1159F MED LIST DOCD IN RCRD: CPT | Performed by: INTERNAL MEDICINE

## 2025-04-09 ASSESSMENT — ANXIETY QUESTIONNAIRES
2. NOT BEING ABLE TO STOP OR CONTROL WORRYING: NOT AT ALL
GAD7 TOTAL SCORE: 0
5. BEING SO RESTLESS THAT IT IS HARD TO SIT STILL: NOT AT ALL
IF YOU CHECKED OFF ANY PROBLEMS ON THIS QUESTIONNAIRE, HOW DIFFICULT HAVE THESE PROBLEMS MADE IT FOR YOU TO DO YOUR WORK, TAKE CARE OF THINGS AT HOME, OR GET ALONG WITH OTHER PEOPLE: NOT DIFFICULT AT ALL
7. FEELING AFRAID AS IF SOMETHING AWFUL MIGHT HAPPEN: NOT AT ALL
4. TROUBLE RELAXING: NOT AT ALL
1. FEELING NERVOUS, ANXIOUS, OR ON EDGE: NOT AT ALL
6. BECOMING EASILY ANNOYED OR IRRITABLE: NOT AT ALL
3. WORRYING TOO MUCH ABOUT DIFFERENT THINGS: NOT AT ALL

## 2025-04-09 ASSESSMENT — ENCOUNTER SYMPTOMS
OCCASIONAL FEELINGS OF UNSTEADINESS: 0
DEPRESSION: 0
LOSS OF SENSATION IN FEET: 0

## 2025-04-09 NOTE — ASSESSMENT & PLAN NOTE
Blood pressure well-controlled on irbesartan 75 mg daily  Orders:    Follow Up In [facility] - PCP - Established    Comprehensive Metabolic Panel; Future    Hemoglobin A1C; Future    Lipid Panel; Future    Albumin-Creatinine Ratio, Urine Random; Future

## 2025-04-09 NOTE — PROGRESS NOTES
"Subjective   Reason for Visit: Amarilis Burger is an 64 y.o. female here for a follow-up visit.     Past Medical, Surgical, and Family History reviewed and updated in chart.    Reviewed all medications by prescribing practitioner or clinical pharmacist (such as prescriptions, OTCs, herbal therapies and supplements) and documented in the medical record.    HPI    Patient Care Team:  Reinaldo Nettles, DO as PCP - General     Review of Systems   All other systems reviewed and are negative.      Objective   Vitals:  /70   Pulse 77   Ht 1.626 m (5' 4\")   Wt 83.9 kg (185 lb)   LMP  (LMP Unknown)   BMI 31.76 kg/m²       Physical Exam  Vitals and nursing note reviewed.   Constitutional:       General: She is not in acute distress.     Appearance: Normal appearance. She is well-developed. She is not toxic-appearing.   HENT:      Head: Normocephalic and atraumatic.      Right Ear: Tympanic membrane and external ear normal.      Left Ear: Tympanic membrane and external ear normal.      Nose: Nose normal.      Mouth/Throat:      Mouth: Mucous membranes are moist.      Pharynx: Oropharynx is clear. No oropharyngeal exudate or posterior oropharyngeal erythema.      Tonsils: No tonsillar exudate. 2+ on the right. 2+ on the left.   Eyes:      Extraocular Movements: Extraocular movements intact.      Conjunctiva/sclera: Conjunctivae normal.   Cardiovascular:      Rate and Rhythm: Normal rate and regular rhythm.      Pulses: Normal pulses.      Heart sounds: Normal heart sounds. No murmur heard.  Pulmonary:      Effort: Pulmonary effort is normal.      Breath sounds: Normal breath sounds.   Abdominal:      General: Abdomen is flat. Bowel sounds are normal.      Palpations: Abdomen is soft.   Musculoskeletal:      Cervical back: Neck supple.   Lymphadenopathy:      Cervical: No cervical adenopathy.   Skin:     General: Skin is warm and dry.      Findings: No rash.   Neurological:      Mental Status: She is alert. Mental " status is at baseline.   Psychiatric:         Mood and Affect: Mood normal.         Behavior: Behavior normal.         Thought Content: Thought content normal.         Judgment: Judgment normal.     Lifestyle Recommendations  I recommend a whole-food plant-based diet, an eating pattern that encourages the consumption of unrefined plant foods (such as fruits, vegetables, tubers, whole grains, legumes, nuts and seeds) and discourages meats, dairy products, eggs and processed foods.     The AHA/ACC recommends that the patient consume a dietary pattern that emphasizes intake of vegetables, fruits, and whole grains; includes low-fat dairy products, poultry, fish, legumes, non-tropical vegetable oils, and nuts; and limits intake of sodium, sweets, sugar-sweetened beverages, and red meats.  Adapt this dietary pattern to appropriate calorie requirements (a 500-750 kcal/day deficit to loose weight), personal and cultural food preferences, and nutrition therapy for other medical conditions (including diabetes).  Achieve this pattern by following plans such as the [place] Mediterranean, DASH dietary pattern, or AHA diet.     Engage in 2 hours and 30 minutes per week of moderate-intensity physical activity, or 1 hour and 15 minutes (75 minutes) per week of vigorous-intensity aerobic physical activity, or an equivalent combination of moderate and vigorous-intensity aerobic physical activity. Aerobic activity should be performed in episodes of at least 10 minutes preferably spread throughout the week.     Adhering to a heart healthy diet, regular exercise habits, avoidance of tobacco products, and maintenance of a healthy weight are crucial components of their heart disease risk reduction.    Patient Health Questionnaire-2 Score: 0 (4/9/2025 10:41 AM).  This indicates a positive screen but may not meet the criteria for a clinical depression diagnosis. Symptoms were reviewed with Amarilis.  Follow-up within the next 3 months is  recommended to re-assess symptoms and monitor mental health status.    Assessment & Plan  Primary hypertension  Blood pressure well-controlled on irbesartan 75 mg daily  Orders:    Follow Up In [facility] - PCP - Established    Comprehensive Metabolic Panel; Future    Hemoglobin A1C; Future    Lipid Panel; Future    Albumin-Creatinine Ratio, Urine Random; Future    Moderate persistent asthma without complication (HHS-HCC)  No respiratory symptoms at this time stable on Advair discus 250/51 elation twice a day       Class 1 obesity due to excess calories with serious comorbidity and body mass index (BMI) of 31.0 to 31.9 in adult  The patient received Current weight: 83.9 kg (185 lb)  Weight change since last visit (-) denotes wt loss 5 lbs   Weight loss needed to achieve BMI 25: 39.7 Lbs  Weight loss needed to achieve BMI 30: 10.6 Lbs    Provided instructions on dietary changes  Provided instructions on exercise  Advised to Increase physical activity because they have an above normal BMI.        IGT (impaired glucose tolerance)  Reevaluate blood work with next visit  Orders:    Comprehensive Metabolic Panel; Future    Hemoglobin A1C; Future    Lipid Panel; Future    Albumin-Creatinine Ratio, Urine Random; Future    Mixed hyperlipidemia  Continue atorvastatin 10 mg daily reevaluate next visit ASCVD risk score 5.3%       Gastroesophageal reflux disease without esophagitis  Symptoms controlled on famotidine 40 mg daily with pantoprazole 40 mg daily and use of simethicone 4 times a day as needed for gas and bloating

## 2025-04-09 NOTE — PROGRESS NOTES
"Subjective   Patient ID: Amarilis Burger is a 64 y.o. female who presents for Follow-up.    HPI     Review of Systems    Objective   /70   Pulse 77   Ht 1.626 m (5' 4\")   Wt 83.9 kg (185 lb)   LMP  (LMP Unknown)   BMI 31.76 kg/m²     Physical Exam    Assessment/Plan          "

## 2025-04-09 NOTE — ASSESSMENT & PLAN NOTE
The patient received Current weight: 83.9 kg (185 lb)  Weight change since last visit (-) denotes wt loss 5 lbs   Weight loss needed to achieve BMI 25: 39.7 Lbs  Weight loss needed to achieve BMI 30: 10.6 Lbs    Provided instructions on dietary changes  Provided instructions on exercise  Advised to Increase physical activity because they have an above normal BMI.

## 2025-04-10 NOTE — ASSESSMENT & PLAN NOTE
Reevaluate blood work with next visit  Orders:    Comprehensive Metabolic Panel; Future    Hemoglobin A1C; Future    Lipid Panel; Future    Albumin-Creatinine Ratio, Urine Random; Future

## 2025-04-10 NOTE — ASSESSMENT & PLAN NOTE
Symptoms controlled on famotidine 40 mg daily with pantoprazole 40 mg daily and use of simethicone 4 times a day as needed for gas and bloating

## 2025-06-06 DIAGNOSIS — K21.9 GASTROESOPHAGEAL REFLUX DISEASE WITHOUT ESOPHAGITIS: ICD-10-CM

## 2025-06-06 RX ORDER — FAMOTIDINE 40 MG/1
TABLET, FILM COATED ORAL
Qty: 90 TABLET | Refills: 3 | Status: SHIPPED | OUTPATIENT
Start: 2025-06-06

## 2025-06-06 NOTE — TELEPHONE ENCOUNTER
I am out of refills on my Famatodine. If I could get a refill in to [facility].  Not urgent, early next week will be fine.  Thank you and have a wonderful weekend.  Lexi Burger

## 2025-06-19 NOTE — PROGRESS NOTES
Chief Complaint:   No chief complaint on file.     History Of Present Illness:    Amarilis Burger is a 65 y.o. female presenting for yearly follow up.  H/o HTN, DL, fast heart rates, atrial septal aneurysm, seasonal allergies, fibromyalgia, GERD who presents for follow up.     Last seen by me in 6/2024. At the time, Pt was still having intermittent episodes of palps, but not long, no assoc LH/dizziness, presyncope. Her activity was limited due to L ankle fracture in 12/23.     Today, Pt denied palpitations; they are no longer bothersome. Denies SOB a/w with this. Denies new cardiac symptoms. The patient is doing well from cardiovascular perspective.  Denies chest pain/pressure, SOB, dyspnea on exertion, LE edema, orthopnea, PND, palpitations, LH/dizziness, presyncope, overt syncope.  Compliant with home cardiac medications as prescribed. She gets meds from PCP.     ROS:  The remainder of the review of systems was obtained, as was negative as pertains to the chief complaint.    CV testing and labs reviewed:  EKG today personally reviewed and demonstrated NSR, HR nl, nonspecific flattening of T-wave     Labs 10/2024:  K 4.2  BUN 13  Cr 0.68  ALT 19  AST 16    Lipid labs 10/2024:    HDL 58.5  LDL 83      Stress test 5/2022   negative stress echo     TTE 5/2022  EF 60% stage I DD, atrial septal aneurysm, bubble study was negative, , trivial aortic valve regurg, trace mitral and tricuspid valve regurg,     Prior hx:  The patient had COVID in 12/2020. Still not feeling well c/ feeling tired. Has a history of palpitations in 6/2019 wore a monitor at that time showed predominant SR high of 156 low 50 av 74. PVC burden 1%, PAC burden <1%. PSVT up to 17 seconds. States that palpitations resolved at that time. Over the past few months palpitations , racing heart that are intermittent. Feels them in her chest, lasting from 30 seconds ot 5 mins. Also reports SRINIVASAN, but no chest pain/pressure, presyncope,  syncope.    Last Recorded Vitals:  There were no vitals filed for this visit.      Past Medical History:  She has a past medical history of Abnormal findings on diagnostic imaging of other specified body structures (07/07/2021), Acute maxillary sinusitis, unspecified (04/28/2021), Arthritis, Asthma, Chronic kidney disease, Delayed emergence from general anesthesia, Dizziness and giddiness (01/06/2021), Encounter for other screening for malignant neoplasm of breast (06/25/2020), GERD (gastroesophageal reflux disease), Hyperlipidemia, Hypertension, Impacted cerumen, right ear (09/12/2017), Other conditions influencing health status (12/07/2020), Other muscle spasm (01/06/2021), Other specified disorders of bladder (11/18/2019), Pain in left foot (06/30/2022), Parageusia (11/27/2020), Personal history of (healed) traumatic fracture (07/02/2022), Personal history of other diseases of the musculoskeletal system and connective tissue, Personal history of other diseases of the respiratory system, Personal history of other specified conditions (11/18/2019), Personal history of other specified conditions (06/02/2022), Personal history of other specified conditions (06/21/2022), Personal history of other specified conditions (11/27/2020), Personal history of other specified conditions (07/07/2021), Personal history of urinary calculi (12/01/2017), PONV (postoperative nausea and vomiting), Unspecified otitis externa, unspecified ear (12/20/2021), Urinary tract infection, Urinary tract infection, site not specified (11/18/2019), Urinary tract infection, site not specified (11/18/2019), and Vertigo.    Past Surgical History:  She has a past surgical history that includes Total hip arthroplasty (Right, 09/12/2017); Hand surgery (09/12/2017); Shoulder surgery (09/12/2017); Tonsillectomy (09/12/2017); Foot surgery (09/12/2017); Other surgical history (06/25/2020); Foot surgery (11/21/2017); and Ankle fracture surgery (Left,  12/29/2023).      Social History:  She reports that she has quit smoking. Her smoking use included cigarettes. She has never used smokeless tobacco. She reports that she does not currently use alcohol. She reports that she does not use drugs.    Family History:  Family History   Adopted: Yes   Family history unknown: Yes        Allergies:  Patient has no known allergies.    Outpatient Medications:  Current Outpatient Medications   Medication Instructions    albuterol 90 mcg/actuation inhaler INHALE 2 PUFFS BY MOUTH AS INSTRUCTED EVERY 4 HOURS AS NEEDED    aspirin 81 mg EC tablet 1 tablet, Daily    atorvastatin (LIPITOR) 10 mg, oral, Daily    cetirizine (ZyrTEC) 10 mg tablet 1 tablet, Daily    cyclobenzaprine (FLEXERIL) 5 mg, oral, 2 times daily    ergocalciferol (VITAMIN D-2) 1.25 mg, Once Weekly    famotidine (Pepcid) 40 mg tablet TAKE ONE TABLET BY MOUTH DAILY AS NEEDED FOR HEARTBURN    fluticasone propion-salmeteroL (Advair Diskus) 250-50 mcg/dose diskus inhaler 1 puff, 2 times daily    ipratropium (Atrovent) 42 mcg (0.06 %) nasal spray INSTILL 2 SPRAYS INTO EACH NOSTRIL THREE TIMES A DAY    irbesartan (AVAPRO) 75 mg, oral, Daily    Lactobacillus acidophilus/FOS (Lactobac acidoph-fructooligos) 500 million cell-50 mg tablet 50 mg, Daily    meclizine (ANTIVERT) 25 mg, oral, 2 times daily PRN    pantoprazole (PROTONIX) 40 mg, oral, Daily    simethicone (MYLICON,GAS-X) 125 mg, 4 times daily       Physical Exam:  Physical Exam  HENT:      Mouth/Throat:      Mouth: Mucous membranes are moist.   Eyes:      Extraocular Movements: Extraocular movements intact.   Cardiovascular:      Rate and Rhythm: Normal rate and regular rhythm.      Heart sounds: S1 normal and S2 normal. No murmur heard.     Comments: No significant carotid bruits  Pulmonary:      Effort: Pulmonary effort is normal.   Abdominal:      Palpations: Abdomen is soft.   Musculoskeletal:      Right lower leg: No edema.      Left lower leg: No edema.   Skin:    "  General: Skin is warm.   Neurological:      General: No focal deficit present.      Mental Status: She is alert.   Psychiatric:         Mood and Affect: Mood normal.     Last Labs:  CBC -  Lab Results   Component Value Date    WBC 4.9 12/29/2023    HGB 12.4 12/29/2023    HCT 36.8 12/29/2023    MCV 94 12/29/2023     12/29/2023       CMP -  Lab Results   Component Value Date    CALCIUM 9.5 10/08/2024    PROT 6.2 (L) 10/08/2024    ALBUMIN 4.2 10/08/2024    AST 16 10/08/2024    ALT 19 10/08/2024    ALKPHOS 45 10/08/2024    BILITOT 0.6 10/08/2024       LIPID PANEL -   Lab Results   Component Value Date    CHOL 164 10/08/2024    TRIG 115 10/08/2024    HDL 58.5 10/08/2024    CHHDL 2.8 10/08/2024    LDLF 80 07/12/2023    VLDL 23 10/08/2024    NHDL 106 10/08/2024       RENAL FUNCTION PANEL -   Lab Results   Component Value Date    GLUCOSE 94 10/08/2024     10/08/2024    K 4.2 10/08/2024     10/08/2024    CO2 30 10/08/2024    ANIONGAP 9 (L) 10/08/2024    BUN 13 10/08/2024    CREATININE 0.68 10/08/2024    CALCIUM 9.5 10/08/2024    ALBUMIN 4.2 10/08/2024        No results found for: \"BNP\", \"HGBA1C\"    Assessment/Plan   Palpitations, unclear etiology. Had event monitor in 2019 demonstrating PAC/PVC's and paroxysmal SVT up to 17 seconds.   SRINIVASAN - FIDEL neg for ischemia 5/22  Atrial septal aneurysm - TTE 5/22 bubble study negative  HTN  DL    Doing well, denies palps.     Plan:  -ASA 81mg daily  -to let us know if palps worsen or more frequent/fast heart rates  -atorvastatin  -continue irbesartan for HTN    Medication Refills  Pt gets medication refills including cardiac meds from their PCP.    Scribe Attestation  By signing my name below, I, Jenn Lopez, Scribe   attest that this documentation has been prepared under the direction and in the presence of Myttle A Mayuga, MD.  "

## 2025-06-20 ENCOUNTER — APPOINTMENT (OUTPATIENT)
Dept: CARDIOLOGY | Facility: CLINIC | Age: 65
End: 2025-06-20
Payer: COMMERCIAL

## 2025-06-20 VITALS
DIASTOLIC BLOOD PRESSURE: 68 MMHG | BODY MASS INDEX: 31.18 KG/M2 | HEART RATE: 78 BPM | HEIGHT: 64 IN | WEIGHT: 182.6 LBS | SYSTOLIC BLOOD PRESSURE: 110 MMHG

## 2025-06-20 DIAGNOSIS — E78.2 MIXED HYPERLIPIDEMIA: ICD-10-CM

## 2025-06-20 DIAGNOSIS — I25.3 ATRIAL SEPTAL ANEURYSM: ICD-10-CM

## 2025-06-20 DIAGNOSIS — I10 PRIMARY HYPERTENSION: ICD-10-CM

## 2025-06-20 PROCEDURE — 3074F SYST BP LT 130 MM HG: CPT | Performed by: INTERNAL MEDICINE

## 2025-06-20 PROCEDURE — 1036F TOBACCO NON-USER: CPT | Performed by: INTERNAL MEDICINE

## 2025-06-20 PROCEDURE — 99213 OFFICE O/P EST LOW 20 MIN: CPT | Performed by: INTERNAL MEDICINE

## 2025-06-20 PROCEDURE — 99214 OFFICE O/P EST MOD 30 MIN: CPT | Performed by: INTERNAL MEDICINE

## 2025-06-20 PROCEDURE — 3078F DIAST BP <80 MM HG: CPT | Performed by: INTERNAL MEDICINE

## 2025-06-20 PROCEDURE — 1159F MED LIST DOCD IN RCRD: CPT | Performed by: INTERNAL MEDICINE

## 2025-06-20 PROCEDURE — 93005 ELECTROCARDIOGRAM TRACING: CPT | Performed by: INTERNAL MEDICINE

## 2025-06-20 PROCEDURE — 3008F BODY MASS INDEX DOCD: CPT | Performed by: INTERNAL MEDICINE

## 2025-06-20 RX ORDER — AZELASTINE 1 MG/ML
2 SPRAY, METERED NASAL EVERY 12 HOURS PRN
COMMUNITY
Start: 2025-04-17

## 2025-06-20 RX ORDER — FLUTICASONE FUROATE AND VILANTEROL 200; 25 UG/1; UG/1
1 POWDER RESPIRATORY (INHALATION)
COMMUNITY
Start: 2025-04-29

## 2025-06-20 NOTE — PATIENT INSTRUCTIONS
Thanks for following up in office today.    1)  Continue monitoring your heart symptoms. If you notice increased palpitations, please call us so we can consider more testing.    2)  Please continue your cardiac medications as prescribed.     Follow up with Dr Godinez in 1 year   If you have any questions, please call us at [phone]

## 2025-06-27 LAB
ATRIAL RATE: 78 BPM
P AXIS: 17 DEGREES
P OFFSET: 188 MS
P ONSET: 130 MS
PR INTERVAL: 162 MS
Q ONSET: 211 MS
QRS COUNT: 13 BEATS
QRS DURATION: 92 MS
QT INTERVAL: 390 MS
QTC CALCULATION(BAZETT): 444 MS
QTC FREDERICIA: 425 MS
R AXIS: -10 DEGREES
T AXIS: 23 DEGREES
T OFFSET: 406 MS
VENTRICULAR RATE: 78 BPM

## 2025-07-15 NOTE — PROGRESS NOTES
"Subjective   Patient ID: Amarilis Burger is a 65 y.o. female who presents for Follow-up (Follow up - last OV 10/24/23 - Colon 2/11/20, EGD 8/22/23).    Patient's PCP is Dr. Nettles    PMH: HTN, tachycardia, fibromyalgia, dyslipidemia, GERD    HPI  Patient has been evaluated by Dr. Evans, Dr. Gomez, and myself in the past for complaints of GERD, constipation, polyps. Her last appointment with myself was in October 2023. She was taking miralax twice daily for chronic constipation.      At her last appointment with myself, she continued to have issues with bloating, LUQ abdominal pain, nausea, and dysphagia despite PPI and H2 blocker. Simethicone PRN helped slightly. Symptoms were thought to be secondary to functional dyspepsia aggravated by stress. Patient did not wish to try amitriptyline in the past. I recommended Fdgard and zofran PRN.    Patient is coming in follow up. She states that overall she has been doing OK. She will still having some intermittent belching, bloating/gas, and RUQ abdominal pain. The RUQ pain is very intermittent and not necessarily worse with eating or movement. She states that it only really hurts if you \"push hard\". She has been managing with dietary avoidance. She states that greasy foods tend to make symptoms worse. She has not been using the simethicone regularly. Stress does continue to make symptoms worse. She states that her constipation is fairly well controlled with at least 3 BM a week. She uses miralax when she remembers. She denies N/V, melena, hematochezia. Weight in chart is stable.    Prior GI evaluation:  EGD 2017: esophagitis, gastritis, negative for h. Pylori    Colonoscopy 2/2020 with Dr. Gomez: 1 TA removed. Multiple hyperplastic colon polyps removed. Repeat colonoscopy recommended in 5 years.    EGD 8/2023: hiatal hernia but otherwise grossly normal. Gastric biopsies with mild chronic gastritis without H. Pylori infection.    RUQ US- unremarkable    HIDA scan- " unremarkable    GES- unremarkable    Review of Systems:  GI: Reports ongoing dyspepsia.        Medications:  Prior to Admission medications    Medication Sig Start Date End Date Taking? Authorizing Provider   albuterol 90 mcg/actuation inhaler INHALE 2 PUFFS BY MOUTH AS INSTRUCTED EVERY 4 HOURS AS NEEDED   Yes Historical Provider, MD   aspirin 81 mg EC tablet Take 1 tablet (81 mg) by mouth once daily. 6/2/22  Yes Historical Provider, MD   atorvastatin (Lipitor) 10 mg tablet Take 1 tablet (10 mg) by mouth once daily.   Yes Historical Provider, MD   cetirizine (ZyrTEC) 10 mg tablet Take 1 tablet (10 mg) by mouth once daily.   Yes Historical Provider, MD   ergocalciferol (Vitamin D-2) 1.25 MG (91879 UT) capsule Take 1 capsule (1,250 mcg) by mouth 1 (one) time per week. 9/12/17  Yes Historical Provider, MD   famotidine (Pepcid) 40 mg tablet TAKE ONE TABLET BY MOUTH DAILY AS NEEDED FOR HEARTBURN 4/12/23  Yes Reinaldo Nettles, DO   ipratropium (Atrovent) 42 mcg (0.06 %) nasal spray INSTILL 2 SPRAYS INTO EACH NOSTRIL THREE TIMES A DAY   Yes Historical Provider, MD   irbesartan (Avapro) 75 mg tablet Take 1 tablet (75 mg) by mouth once daily.   Yes Historical Provider, MD   Lactobacillus acidophilus/FOS (Lactobac acidoph-fructooligos) 500 million cell-50 mg tablet Take 50 mg by mouth once daily. 9/12/17  Yes Historical Provider, MD   loratadine (Claritin) 10 mg tablet Take 1 tablet (10 mg) by mouth once daily as needed. 4/26/22  Yes Historical Provider, MD   meclizine (Antivert) 25 mg tablet Take 1 tablet (25 mg) by mouth 3 times a day as needed for dizziness. 10/10/23  Yes Reinaldo Nettles,    pantoprazole (ProtoNix) 40 mg EC tablet Take 1 tablet (40 mg) by mouth once daily.   Yes Historical Provider, MD   simethicone (Mylicon,Gas-X) 125 mg capsule Take 1 capsule (125 mg) by mouth 4 times a day. 5/25/23  Yes Historical Provider, MD   Symbicort 160-4.5 mcg/actuation inhaler INHALE 2 PUFFS INTO THE LUNGS TWICE DAILY AS  DIRECTED   Yes Historical Provider, MD       Allergies:  Patient has no known allergies.    Past Medical History:  She has a past medical history of Abnormal findings on diagnostic imaging of other specified body structures (07/07/2021), Acute maxillary sinusitis, unspecified (04/28/2021), Allergic, Arthritis, Asthma, Chronic kidney disease, Delayed emergence from general anesthesia, Diverticulosis, Dizziness, Dizziness and giddiness (01/06/2021), Encounter for other screening for malignant neoplasm of breast (06/25/2020), Fibromyalgia, primary, Fractures, GERD (gastroesophageal reflux disease), Hyperlipidemia, Hypertension, Impacted cerumen, right ear (09/12/2017), Other conditions influencing health status (12/07/2020), Other muscle spasm (01/06/2021), Other specified disorders of bladder (11/18/2019), Pain in left foot (06/30/2022), Parageusia (11/27/2020), Personal history of (healed) traumatic fracture (07/02/2022), Personal history of other diseases of the musculoskeletal system and connective tissue, Personal history of other diseases of the respiratory system, Personal history of other specified conditions (11/18/2019), Personal history of other specified conditions (06/02/2022), Personal history of other specified conditions (06/21/2022), Personal history of other specified conditions (11/27/2020), Personal history of other specified conditions (07/07/2021), Personal history of urinary calculi (12/01/2017), PONV (postoperative nausea and vomiting), Shortness of breath, Unspecified otitis externa, unspecified ear (12/20/2021), Urinary tract infection, Urinary tract infection, site not specified (11/18/2019), Urinary tract infection, site not specified (11/18/2019), and Vertigo.    Past Surgical History:  She has a past surgical history that includes Total hip arthroplasty (Right, 09/12/2017); Hand surgery (09/12/2017); Shoulder surgery (09/12/2017); Tonsillectomy (09/12/2017); Foot surgery (09/12/2017);  "Other surgical history (06/25/2020); Foot surgery (11/21/2017); Ankle fracture surgery (Left, 12/29/2023); Joint replacement (4/2015); Tubal ligation; and Fracture surgery (12/29/2023).    Social History:  She reports that she has quit smoking. Her smoking use included cigarettes. She has never used smokeless tobacco. She reports that she does not currently use alcohol. She reports that she does not use drugs.    Objective   Physical exam:  Constitutional: Well developed, well nourished 65 y.o. year old in no acute distress.   CV: RRR  Resp: CTAB  GI: Normal bowel sounds. Soft, nondistended, slight TTP in RUQ and epigastric region with deep palpation. No rebound or guarding.  Neuro: Alert and oriented x 3. Cranial nerves 2-12 grossly intact.   Psych: Normal mood and affect.      Relevant Results Recent labs reviewed in the EMR.  Lab Results   Component Value Date    HGB 12.4 12/29/2023    HGB 12.4 07/12/2023    HGB 13.2 05/27/2022    HGB 13.1 12/11/2021    MCV 94 12/29/2023    MCV 94 07/12/2023    MCV 93 05/27/2022    MCV 96 12/11/2021     12/29/2023     07/12/2023     05/27/2022     12/11/2021       No results found for: \"FERRITIN\", \"IRON\"    Lab Results   Component Value Date     10/08/2024    K 4.2 10/08/2024     10/08/2024    BUN 13 10/08/2024    CREATININE 0.68 10/08/2024       Lab Results   Component Value Date    BILITOT 0.6 10/08/2024     Lab Results   Component Value Date    ALT 19 10/08/2024    ALT 19 04/09/2024    ALT 14 07/12/2023    ALT 13 06/28/2022    ALT 17 05/27/2022    AST 16 10/08/2024    AST 15 04/09/2024    AST 13 07/12/2023    AST 12 06/28/2022    AST 15 05/27/2022    ALKPHOS 45 10/08/2024    ALKPHOS 52 04/09/2024    ALKPHOS 43 07/12/2023    ALKPHOS 45 06/28/2022    ALKPHOS 42 05/27/2022       No results found for: \"CRP\"    No results found for: \"CALPS\"    Radiology: Reviewed imaging reviewed in the EMR.  No results found.    Assessment/Plan   Problem " List Items Addressed This Visit           ICD-10-CM    Colon polyps K63.5    Patient is due for surveillance colonoscopy. She wishes to do this in December. Order placed. OK for endo. No meds to hold.         Relevant Orders    Colonoscopy Screening; High Risk Patient    GERD (gastroesophageal reflux disease) K21.9    On H2 blocker and PPI. States that she doesn't need refills at this time as her PCP prescribed pantoprazole. She gets the famotidine OTC.         Functional dyspepsia - Primary K30    Patient with intermittent dyspepsia symptoms. No red flag symptoms. She has had a fairly comprehensive work up over the years. We discussed testing for SIBO. Patient not interested at this time, but would reconsider if symptoms worsen. Recommended continuing acid reducers. Simethicone after meals and at bedtime was encouraged. She will follow up as needed for symptoms.          Relevant Medications    simethicone (Mylicon,Gas-X) 125 mg capsule           Kiara Barrett PA-C

## 2025-07-16 ENCOUNTER — APPOINTMENT (OUTPATIENT)
Facility: CLINIC | Age: 65
End: 2025-07-16
Payer: COMMERCIAL

## 2025-07-16 VITALS
BODY MASS INDEX: 30.9 KG/M2 | DIASTOLIC BLOOD PRESSURE: 82 MMHG | SYSTOLIC BLOOD PRESSURE: 120 MMHG | HEIGHT: 64 IN | WEIGHT: 181 LBS | HEART RATE: 80 BPM

## 2025-07-16 DIAGNOSIS — D12.6 ADENOMATOUS POLYP OF COLON, UNSPECIFIED PART OF COLON: ICD-10-CM

## 2025-07-16 DIAGNOSIS — K30 FUNCTIONAL DYSPEPSIA: Primary | ICD-10-CM

## 2025-07-16 DIAGNOSIS — K21.9 GASTROESOPHAGEAL REFLUX DISEASE WITHOUT ESOPHAGITIS: ICD-10-CM

## 2025-07-16 PROCEDURE — 3074F SYST BP LT 130 MM HG: CPT | Performed by: PHYSICIAN ASSISTANT

## 2025-07-16 PROCEDURE — 1160F RVW MEDS BY RX/DR IN RCRD: CPT | Performed by: PHYSICIAN ASSISTANT

## 2025-07-16 PROCEDURE — 3008F BODY MASS INDEX DOCD: CPT | Performed by: PHYSICIAN ASSISTANT

## 2025-07-16 PROCEDURE — 99214 OFFICE O/P EST MOD 30 MIN: CPT | Performed by: PHYSICIAN ASSISTANT

## 2025-07-16 PROCEDURE — 1159F MED LIST DOCD IN RCRD: CPT | Performed by: PHYSICIAN ASSISTANT

## 2025-07-16 PROCEDURE — 3079F DIAST BP 80-89 MM HG: CPT | Performed by: PHYSICIAN ASSISTANT

## 2025-07-16 RX ORDER — SIMETHICONE 125 MG
125 CAPSULE ORAL 4 TIMES DAILY
Qty: 120 CAPSULE | Refills: 3 | Status: SHIPPED | OUTPATIENT
Start: 2025-07-16

## 2025-07-16 NOTE — ASSESSMENT & PLAN NOTE
On H2 blocker and PPI. States that she doesn't need refills at this time as her PCP prescribed pantoprazole. She gets the famotidine OTC.

## 2025-07-16 NOTE — ASSESSMENT & PLAN NOTE
Patient is due for surveillance colonoscopy. She wishes to do this in December. Order placed. OK for endo. No meds to hold.

## 2025-07-16 NOTE — ASSESSMENT & PLAN NOTE
Patient with intermittent dyspepsia symptoms. No red flag symptoms. She has had a fairly comprehensive work up over the years. We discussed testing for SIBO. Patient not interested at this time, but would reconsider if symptoms worsen. Recommended continuing acid reducers. Simethicone after meals and at bedtime was encouraged. She will follow up as needed for symptoms.

## 2025-07-17 ENCOUNTER — TELEPHONE (OUTPATIENT)
Facility: CLINIC | Age: 65
End: 2025-07-17

## 2025-07-21 ENCOUNTER — TELEPHONE (OUTPATIENT)
Dept: PRIMARY CARE | Facility: CLINIC | Age: 65
End: 2025-07-21
Payer: COMMERCIAL

## 2025-07-21 NOTE — TELEPHONE ENCOUNTER
Patient is complaining that her back pain/spasms have really been acting up and would like to see doctor some time this week

## 2025-07-23 ENCOUNTER — OFFICE VISIT (OUTPATIENT)
Dept: PRIMARY CARE | Facility: CLINIC | Age: 65
End: 2025-07-23
Payer: COMMERCIAL

## 2025-07-23 VITALS
HEIGHT: 64 IN | HEART RATE: 68 BPM | DIASTOLIC BLOOD PRESSURE: 70 MMHG | WEIGHT: 180 LBS | SYSTOLIC BLOOD PRESSURE: 110 MMHG | BODY MASS INDEX: 30.73 KG/M2

## 2025-07-23 DIAGNOSIS — M62.830 SPASM OF MUSCLE OF LOWER BACK: Primary | ICD-10-CM

## 2025-07-23 PROCEDURE — 1160F RVW MEDS BY RX/DR IN RCRD: CPT | Performed by: INTERNAL MEDICINE

## 2025-07-23 PROCEDURE — 99213 OFFICE O/P EST LOW 20 MIN: CPT | Performed by: INTERNAL MEDICINE

## 2025-07-23 PROCEDURE — 3078F DIAST BP <80 MM HG: CPT | Performed by: INTERNAL MEDICINE

## 2025-07-23 PROCEDURE — 3074F SYST BP LT 130 MM HG: CPT | Performed by: INTERNAL MEDICINE

## 2025-07-23 PROCEDURE — 3008F BODY MASS INDEX DOCD: CPT | Performed by: INTERNAL MEDICINE

## 2025-07-23 PROCEDURE — 1159F MED LIST DOCD IN RCRD: CPT | Performed by: INTERNAL MEDICINE

## 2025-07-23 RX ORDER — METHYLPREDNISOLONE 4 MG/1
TABLET ORAL
Qty: 21 TABLET | Refills: 0 | Status: SHIPPED | OUTPATIENT
Start: 2025-07-23 | End: 2025-07-29

## 2025-07-23 ASSESSMENT — ENCOUNTER SYMPTOMS
ABDOMINAL PAIN: 0
PARESTHESIAS: 0
WEAKNESS: 0
TINGLING: 0
DYSURIA: 0
WEIGHT LOSS: 0
NUMBNESS: 0
HEADACHES: 0
BOWEL INCONTINENCE: 0
LEG PAIN: 0
PERIANAL NUMBNESS: 0
BACK PAIN: 1
PARESIS: 0
FEVER: 0

## 2025-07-23 NOTE — PROGRESS NOTES
"Subjective   Patient ID: Amarilis Burger is a 65 y.o. female who presents for Back Pain (complaining that her back pain/spasms have really been acting up taking tylenol and Advil alternating takes muscle relaxer but try not due makes her sleepy. Started around the 4th of July starts in the middle of back and down.).    HPI     Review of Systems    Objective   /70   Pulse 68   Ht 1.626 m (5' 4\")   Wt 81.6 kg (180 lb)   LMP  (LMP Unknown)   BMI 30.90 kg/m²     Physical Exam    Assessment/Plan          "

## 2025-07-23 NOTE — PROGRESS NOTES
"Subjective   Reason for Visit: Amarilis Burger is an 65 y.o. female here for a acute visit.     Past Medical, Surgical, and Family History reviewed and updated in chart.    Reviewed all medications by prescribing practitioner or clinical pharmacist (such as prescriptions, OTCs, herbal therapies and supplements) and documented in the medical record.    Back Pain  This is a recurrent problem. The current episode started 1 to 4 weeks ago. The problem occurs 2 to 4 times per day. The problem has been waxing and waning since onset. The pain is present in the lumbar spine, sacro-iliac and thoracic spine. The quality of the pain is described as burning, shooting and stabbing. The pain radiates to the right knee. The pain is at a severity of 7/10. The pain is The same all the time. The symptoms are aggravated by position and sitting. Stiffness is present All day. Pertinent negatives include no abdominal pain, bladder incontinence, bowel incontinence, chest pain, dysuria, fever, headaches, leg pain, numbness, paresis, paresthesias, pelvic pain, perianal numbness, tingling, weakness or weight loss. Risk factors include lack of exercise, menopause and obesity. She has tried heat, muscle relaxant and ice for the symptoms. The treatment provided moderate relief.       Patient Care Team:  Reinaldo Nettles DO as PCP - General     Review of Systems   Constitutional:  Negative for fever and weight loss.   Cardiovascular:  Negative for chest pain.   Gastrointestinal:  Negative for abdominal pain and bowel incontinence.   Genitourinary:  Negative for bladder incontinence, dysuria and pelvic pain.   Musculoskeletal:  Positive for back pain.   Neurological:  Negative for tingling, weakness, numbness, headaches and paresthesias.       Objective   Vitals:  /70   Pulse 68   Ht 1.626 m (5' 4\")   Wt 81.6 kg (180 lb)   LMP  (LMP Unknown)   BMI 30.90 kg/m²       Physical Exam  Vitals and nursing note reviewed.   Constitutional:  "      General: She is not in acute distress.     Appearance: Normal appearance. She is well-developed. She is not toxic-appearing.   HENT:      Head: Normocephalic and atraumatic.      Right Ear: Tympanic membrane and external ear normal.      Left Ear: Tympanic membrane and external ear normal.      Nose: Nose normal.      Mouth/Throat:      Mouth: Mucous membranes are moist.      Pharynx: Oropharynx is clear. No oropharyngeal exudate or posterior oropharyngeal erythema.      Tonsils: No tonsillar exudate. 2+ on the right. 2+ on the left.     Eyes:      Extraocular Movements: Extraocular movements intact.      Conjunctiva/sclera: Conjunctivae normal.       Cardiovascular:      Rate and Rhythm: Normal rate and regular rhythm.      Pulses: Normal pulses.      Heart sounds: Normal heart sounds. No murmur heard.  Pulmonary:      Effort: Pulmonary effort is normal.      Breath sounds: Normal breath sounds.   Abdominal:      General: Abdomen is flat. Bowel sounds are normal.      Palpations: Abdomen is soft.     Musculoskeletal:      Cervical back: Neck supple.   Lymphadenopathy:      Cervical: No cervical adenopathy.     Skin:     General: Skin is warm and dry.      Findings: No rash.     Neurological:      Mental Status: She is alert. Mental status is at baseline.     Psychiatric:         Mood and Affect: Mood normal.         Behavior: Behavior normal.         Thought Content: Thought content normal.         Judgment: Judgment normal.         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  She denies fever radiating symptoms into her legs or other red flag symptoms that would suggest an indication for imaging.  She was given conservative therapy with Medrol Dosepak and continue with muscle relaxers education given on management of acute on chronic low back pain  reevaluate with regular scheduled appointment  Orders:    methylPREDNISolone (Medrol Dospak) 4 mg tablets; Take as directed on package.

## 2025-10-09 ENCOUNTER — APPOINTMENT (OUTPATIENT)
Dept: PRIMARY CARE | Facility: CLINIC | Age: 65
End: 2025-10-09
Payer: COMMERCIAL

## 2025-12-12 ENCOUNTER — APPOINTMENT (OUTPATIENT)
Dept: OBSTETRICS AND GYNECOLOGY | Facility: CLINIC | Age: 65
End: 2025-12-12
Payer: COMMERCIAL

## (undated) DEVICE — SYRINGE, 20 CC, LUER LOCK, MONOJECT, W/O CAP, LF

## (undated) DEVICE — SYRINGE, 10 CC, LUER LOCK

## (undated) DEVICE — GUIDEWIRE, 1.6 X 150 THREADED

## (undated) DEVICE — SPONGE, GAUZE, XRAY DECT, 16 PLY, 4 X 4, W/MASTER DMT,STERILE

## (undated) DEVICE — DRAPE, C-ARM, FLUROSCAN, MINI

## (undated) DEVICE — Device

## (undated) DEVICE — BIT, DRILL, QUICK-COUPLING, 2.5 X 110 MM, STAINLESS STEEL, GOLD

## (undated) DEVICE — BANDAGE, COMPRESSION, EZE-BAND, 4 X 11 YDS

## (undated) DEVICE — BIT, DRILL, CANNULATED, QUICK CONNECT, 3.2 X 170/140 MM, STAINLESS STEEL

## (undated) DEVICE — CUFF, TOURNIQUET, DUAL PORT, SNG BLADDER, 34 IN, PLC

## (undated) DEVICE — DRILL, 2.8 X 165

## (undated) DEVICE — COVER HANDLE LIGHT, STERIS, BLUE, STERILE

## (undated) DEVICE — DRESSING, GAUZE, PETROLATUM, STRIP, XEROFORM, 1 X 8 IN, STERILE

## (undated) DEVICE — TAPE, CAST, SCOTCHCAST, PLUS, 4 IN X 4 YD, FIBERGLASS, WHITE

## (undated) DEVICE — SOLUTION, SALINE, 100ML

## (undated) DEVICE — GLOVE, SURGICAL, PROTEXIS PI BLUE W/NEUTHERA, 8.0, PF, LF

## (undated) DEVICE — DRAPE, SHEET, THREE QUARTER, FAN FOLD, 57 X 77 IN

## (undated) DEVICE — DRAPE, SHEET, U, STERI DRAPE, 47 X 51 IN, DISPOSABLE, STERILE

## (undated) DEVICE — SUTURE, ETHILON, 3-0, 18 IN, PS1, BLACK

## (undated) DEVICE — PADDING, CAST, SOFT, 4 IN

## (undated) DEVICE — SPONGE, LAP, XRAY DECT, 18IN X 18IN, W/MASTER DMT, STERILE

## (undated) DEVICE — SUTURE, VICRYL, 4-0, 18 IN, PS2, UNDYED

## (undated) DEVICE — DRESSING, ABDOMINAL, WET PRUF, TENDERSORB, 5 X 9 IN, STERILE

## (undated) DEVICE — TOWEL PACK, STERILE, 4/PACK, BLUE

## (undated) DEVICE — APPLICATOR, CHLORAPREP, W/ORANGE TINT, 26ML